# Patient Record
Sex: FEMALE | Race: WHITE | NOT HISPANIC OR LATINO | URBAN - METROPOLITAN AREA
[De-identification: names, ages, dates, MRNs, and addresses within clinical notes are randomized per-mention and may not be internally consistent; named-entity substitution may affect disease eponyms.]

---

## 2017-01-03 ENCOUNTER — THERAPY VISIT (OUTPATIENT)
Dept: CHIROPRACTIC MEDICINE | Facility: CLINIC | Age: 62
End: 2017-01-03
Payer: MEDICAID

## 2017-01-03 DIAGNOSIS — M54.50 LUMBAGO: ICD-10-CM

## 2017-01-03 DIAGNOSIS — M62.838 SPASM OF MUSCLE: ICD-10-CM

## 2017-01-03 DIAGNOSIS — M99.03 SEGMENTAL DYSFUNCTION OF LUMBAR REGION: ICD-10-CM

## 2017-01-03 DIAGNOSIS — M99.05 SEGMENTAL DYSFUNCTION OF PELVIC REGION: Primary | ICD-10-CM

## 2017-01-03 PROCEDURE — 98940 CHIROPRACT MANJ 1-2 REGIONS: CPT | Mod: AT | Performed by: CHIROPRACTOR

## 2017-01-03 PROCEDURE — 99203 OFFICE O/P NEW LOW 30 MIN: CPT | Mod: 25 | Performed by: CHIROPRACTOR

## 2017-01-03 NOTE — MR AVS SNAPSHOT
"              After Visit Summary   1/3/2017    Lucia Hector    MRN: [id number]           Patient Information     Date Of Birth          1955        Visit Information        Provider Department      1/3/2017 10:00 AM Joaquim Montes DC [facility]        Today's Diagnoses     Segmental dysfunction of pelvic region    -  1     Spasm of muscle         Segmental dysfunction of lumbar region         Lumbago            Follow-ups after your visit        Who to contact     If you have questions or need follow up information about today's clinic visit or your schedule please contact [facility] directly at [phone].  Normal or non-critical lab and imaging results will be communicated to you by Diagnosofthart, letter or phone within 4 business days after the clinic has received the results. If you do not hear from us within 7 days, please contact the clinic through Diagnosofthart or phone. If you have a critical or abnormal lab result, we will notify you by phone as soon as possible.  Submit refill requests through Casabu or call your pharmacy and they will forward the refill request to us. Please allow 3 business days for your refill to be completed.          Additional Information About Your Visit        MyChart Information     Casabu lets you send messages to your doctor, view your test results, renew your prescriptions, schedule appointments and more. To sign up, go to [email or web address] . Click on \"Log in\" on the left side of the screen, which will take you to the Welcome page. Then click on \"Sign up Now\" on the right side of the page.     You will be asked to enter the access code listed below, as well as some personal information. Please follow the directions to create your username and password.     Your access code is: [email or web address]  Expires: 3/13/2017  2:12 PM     Your access code will  in 90 days. If you need help or a new code, please call your [place] clinic or " [phone].        Care EveryWhere ID     This is your Care EveryWhere ID. This could be used by other organizations to access your [place] medical records  [id number]        Your Vitals Were     Last Period                   07/12/2008            Blood Pressure from Last 3 Encounters:   12/15/16 114/65   12/13/16 113/64   11/23/15 132/84    Weight from Last 3 Encounters:   12/15/16 64.411 kg (142 lb)   12/13/16 63.05 kg (139 lb)   11/23/15 68.266 kg (150 lb 8 oz)              We Performed the Following     CHIROPRAC MANIP,SPINAL,1-2 REGIONS     OFFICE/OUTPT VISIT,APRIL NIELSEN III        Primary Care Provider Office Phone # Fax #    Reggie Burnette [phone]       [address] [facility] [address]        Thank you!     Thank you for choosing [facility]  for your care. Our goal is always to provide you with excellent care. Hearing back from our patients is one way we can continue to improve our services. Please take a few minutes to complete the written survey that you may receive in the mail after your visit with us. Thank you!             Your Updated Medication List - Protect others around you: Learn how to safely use, store and throw away your medicines at www.disposemymeds.org.      Notice  As of 1/3/2017 11:30 AM    You have not been prescribed any medications.
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
## 2017-01-06 ENCOUNTER — TELEPHONE (OUTPATIENT)
Dept: INTERNAL MEDICINE | Facility: CLINIC | Age: 62
End: 2017-01-06

## 2017-01-06 NOTE — Clinical Note
January 6, 2017    Lucia Hector  [address]  [facility] [address]    Dear Lucia    We care about your health and have reviewed your health plan. We have reviewed your medical conditions, medication list, and lab results and are making recommendations based on this review, to better manage your health.    You are in particular need of attention regarding:  - Completing a Colon Cancer Screening (FIT) - Please complete FIT test a test to check for blood in your stool  and mail completed test to clinic.      Here is a list of Health Maintenance topics that are due now or due soon:  Health Maintenance Due   Topic Date Due     HEPATITIS C SCREENING  12/29/1975     FIT Q1 YR (NO INBASKET)  08/13/2016     INFLUENZA VACCINE (SYSTEM ASSIGNED)  09/01/2016     ADVANCE DIRECTIVE PLANNING Q5 YRS (NO INBASKET)  12/13/2016     We will be calling you in the next couple of weeks to help you schedule any appointments that are needed.  Please call us at [phone] (or use Sendori) to address the above recommendations.     Thank you for trusting [facility] and we appreciate the opportunity to serve you.  We look forward to supporting your healthcare needs in the future.    Healthy Regards,    Rachael Moralze/josué

## 2017-01-06 NOTE — TELEPHONE ENCOUNTER
Panel Management Review      Patient has the following on her problem list:       Composite cancer screening  Chart review shows that this patient is due/due soon for the following Fecal Colorectal (FIT)  Summary:    Patient is due/failing the following:   FIT    Action needed:   Needs to complete a FIT     Type of outreach:    Sent letter.    Questions for provider review:    None                                                                                                                                    Abida Fang ma       Chart routed to Care Team .

## 2017-01-18 ENCOUNTER — THERAPY VISIT (OUTPATIENT)
Dept: CHIROPRACTIC MEDICINE | Facility: CLINIC | Age: 62
End: 2017-01-18
Payer: MEDICAID

## 2017-01-18 DIAGNOSIS — M54.50 LUMBAGO: ICD-10-CM

## 2017-01-18 DIAGNOSIS — M99.03 SEGMENTAL DYSFUNCTION OF LUMBAR REGION: ICD-10-CM

## 2017-01-18 DIAGNOSIS — M99.05 SEGMENTAL DYSFUNCTION OF PELVIC REGION: Primary | ICD-10-CM

## 2017-01-18 DIAGNOSIS — M62.838 SPASM OF MUSCLE: ICD-10-CM

## 2017-01-18 PROCEDURE — 98940 CHIROPRACT MANJ 1-2 REGIONS: CPT | Mod: AT | Performed by: CHIROPRACTOR

## 2017-01-18 NOTE — MR AVS SNAPSHOT
"              After Visit Summary   2017    Lucia Hector    MRN: [id number]           Patient Information     Date Of Birth          1955        Visit Information        Provider Department      2017 11:00 AM Joaquim Montes DC [facility]        Today's Diagnoses     Segmental dysfunction of pelvic region    -  1     Lumbago         Segmental dysfunction of lumbar region         Spasm of muscle            Follow-ups after your visit        Who to contact     If you have questions or need follow up information about today's clinic visit or your schedule please contact [facility] directly at [phone].  Normal or non-critical lab and imaging results will be communicated to you by Varonis Systemshart, letter or phone within 4 business days after the clinic has received the results. If you do not hear from us within 7 days, please contact the clinic through Varonis Systemshart or phone. If you have a critical or abnormal lab result, we will notify you by phone as soon as possible.  Submit refill requests through [facility] [employer] or call your pharmacy and they will forward the refill request to us. Please allow 3 business days for your refill to be completed.          Additional Information About Your Visit        MyChart Information     [facility] lets you send messages to your doctor, view your test results, renew your prescriptions, schedule appointments and more. To sign up, go to [email or web address] . Click on \"Log in\" on the left side of the screen, which will take you to the Welcome page. Then click on \"Sign up Now\" on the right side of the page.     You will be asked to enter the access code listed below, as well as some personal information. Please follow the directions to create your username and password.     Your access code is: -[email or web address]  Expires: 3/13/2017  2:12 PM     Your access code will  in 90 days. If you need help or a new code, please call your [place] clinic or " [phone].        Care EveryWhere ID     This is your Care EveryWhere ID. This could be used by other organizations to access your [place] medical records  [id number]        Your Vitals Were     Last Period                   07/12/2008            Blood Pressure from Last 3 Encounters:   12/15/16 114/65   12/13/16 113/64   11/23/15 132/84    Weight from Last 3 Encounters:   12/15/16 64.411 kg (142 lb)   12/13/16 63.05 kg (139 lb)   11/23/15 68.266 kg (150 lb 8 oz)              We Performed the Following     CHIROPRAC MANIP,SPINAL,1-2 REGIONS        Primary Care Provider Office Phone # Fax #    DESTINEY Peña [phone]       [address] [address]        Thank you!     Thank you for choosing [facility]  for your care. Our goal is always to provide you with excellent care. Hearing back from our patients is one way we can continue to improve our services. Please take a few minutes to complete the written survey that you may receive in the mail after your visit with us. Thank you!             Your Updated Medication List - Protect others around you: Learn how to safely use, store and throw away your medicines at www.disposemymeds.org.      Notice  As of 1/18/2017 11:51 AM    You have not been prescribed any medications.

## 2017-01-18 NOTE — PROGRESS NOTES
Visit #:  2 of 6, based on treatment plan    Subjective:  Lucia Hector is a 61 year old female who is seen in f/u up for:        Segmental dysfunction of pelvic region  Lumbago  Segmental dysfunction of lumbar region  Spasm of muscle.     Since last visit on 1/3/2017,  Lucia Hector reports the following changes: Pain immediately after last treatment: 2/10 and their pain level today 4/10.  Lucia reports taht she is not feeling pain rather she is feeling stiffness and discomfort.  She would like to be able to work out again comfortably.  Despite still feeling a bit stiff she does feel that there was a slight improvement overall.    Area of chief complaint:  Lumbar :  Symptoms are graded at 4/10. The quality is described as stiff.  Motion has increased, but is still not normal, has decreased, no improvement. Patient feels that they are improved due to a reduction in symptoms.        Objective:  The following was observed:    P: palpatory tenderness, R pififormis    A: static palpation demonstrates intersegmental asymmetry , pelvis and lumbar spine    R: motion palpation notes restricted motion, :  L4 Right rotation restricted  L5 Right rotation restricted  PSIS Right Extension restriction    T: hypertonicity at: Piriformis R>>L      Assessment:    Segmental spinal dysfunction/restrictions found at:  L4  L5  PSIS Right    Diagnoses:      1. Segmental dysfunction of pelvic region    2. Lumbago    3. Segmental dysfunction of lumbar region    4. Spasm of muscle        Patient's condition:  Patient had restrictions pre-manipulation    Treatment effectiveness:  Post manipulation there is better intersegmental movement and Patient claims to feel looser post manipulation      Procedures:  CMT:  69007 Chiropractic manipulative treatment 1-2 regions performed   Lumbar: Activator, L4, L5, Prone  Pelvis: Drop Table, PSIS Right , Prone    Modalities:  93712: MSTM:  To Piriformis  for 5 min    Therapeutic  procedures:  None      Prognosis: Good    Progress towards Goals: Patient is making progress towards the goal     Response to Treatment:   Reduction in symptoms as reported by patient      Recommendations:    Instructions:stretch as instructed at visit    Follow-up:  Return to care in one week.    Increase water intake -    Consider follow up with physical therapy

## 2017-01-31 ENCOUNTER — THERAPY VISIT (OUTPATIENT)
Dept: CHIROPRACTIC MEDICINE | Facility: CLINIC | Age: 62
End: 2017-01-31
Payer: MEDICAID

## 2017-01-31 DIAGNOSIS — M54.50 LUMBAGO: ICD-10-CM

## 2017-01-31 DIAGNOSIS — M99.05 SEGMENTAL DYSFUNCTION OF PELVIC REGION: Primary | ICD-10-CM

## 2017-01-31 DIAGNOSIS — M99.03 SOMATIC DYSFUNCTION OF LUMBAR REGION: ICD-10-CM

## 2017-01-31 DIAGNOSIS — M62.838 SPASM OF MUSCLE: ICD-10-CM

## 2017-01-31 PROCEDURE — 98940 CHIROPRACT MANJ 1-2 REGIONS: CPT | Mod: AT | Performed by: CHIROPRACTOR

## 2017-01-31 NOTE — PROGRESS NOTES
Visit #:  3 of 6, based on treatment plan    Subjective:  Lucia Hector is a 61 year old female who is seen in f/u up for:        Segmental dysfunction of pelvic region  Lumbago  Segmental dysfunction of lumbar region  Spasm of muscle.     Since last visit on 1/18/2017,  Lucia Hector reports the following changes: Pain immediately after last treatment: 2/10 and their pain level today 3/10.  Lucia reports that she is still feeling some stiffness in her low back but overall is feeling better and is not having any pain.  Overall she is feeling better.    Area of chief complaint:  Lumbar :  Symptoms are graded at 3/10. The quality is described as stiff.  Motion has increased, but is still not normal, has decreased, no improvement. Patient feels that they are improved due to a reduction in symptoms.        Objective:  The following was observed:    P: palpatory tenderness, R pififormis    A: static palpation demonstrates intersegmental asymmetry , pelvis and lumbar spine    R: motion palpation notes restricted motion, :  L4 Right rotation restricted  L5 Right rotation restricted  PSIS Right Extension restriction    T: hypertonicity at: Piriformis R>>L      Assessment:    Segmental spinal dysfunction/restrictions found at:  L4  L5  PSIS Right    Diagnoses:      1. Segmental dysfunction of pelvic region    2. Lumbago    3. Segmental dysfunction of lumbar region    4. Spasm of muscle        Patient's condition:  Patient had restrictions pre-manipulation    Treatment effectiveness:  Post manipulation there is better intersegmental movement and Patient claims to feel looser post manipulation      Procedures:  CMT:  87281 Chiropractic manipulative treatment 1-2 regions performed   Lumbar: Activator, L4, L5, Prone  Pelvis: Drop Table, PSIS Right , Prone    Modalities:  66372: MSTM:  To Piriformis  for 5 min    Therapeutic procedures:  None      Prognosis: Good    Progress towards Goals: Patient is making progress towards the  goal     Response to Treatment:   Reduction in symptoms as reported by patient      Recommendations:    Instructions:stretch as instructed at visit    Follow-up:  Return to care in one week.

## 2017-01-31 NOTE — MR AVS SNAPSHOT
"              After Visit Summary   2017    Lucia Hector    MRN: [id number]           Patient Information     Date Of Birth          1955        Visit Information        Provider Department      2017 11:45 AM Joaquim Montes [facility]        Today's Diagnoses     Segmental dysfunction of pelvic region    -  1     Lumbago         Somatic dysfunction of lumbar region         Spasm of muscle            Follow-ups after your visit        Who to contact     If you have questions or need follow up information about today's clinic visit or your schedule please contact [facility] directly at [phone].  Normal or non-critical lab and imaging results will be communicated to you by Online Prasadhart, letter or phone within 4 business days after the clinic has received the results. If you do not hear from us within 7 days, please contact the clinic through Online Prasadhart or phone. If you have a critical or abnormal lab result, we will notify you by phone as soon as possible.  Submit refill requests through [employer] or call your pharmacy and they will forward the refill request to us. Please allow 3 business days for your refill to be completed.          Additional Information About Your Visit        MyChart Information     B[employer] lets you send messages to your doctor, view your test results, renew your prescriptions, schedule appointments and more. To sign up, go to [email or web address] . Click on \"Log in\" on the left side of the screen, which will take you to the Welcome page. Then click on \"Sign up Now\" on the right side of the page.     You will be asked to enter the access code listed below, as well as some personal information. Please follow the directions to create your username and password.     Your access code is: [email or web address]  Expires: 3/13/2017  2:12 PM     Your access code will  in 90 days. If you need help or a new code, please call your [place] clinic or " [phone].        Care EveryWhere ID     This is your Care EveryWhere ID. This could be used by other organizations to access your [place] medical records  [id number]        Your Vitals Were     Last Period                   07/12/2008            Blood Pressure from Last 3 Encounters:   12/15/16 114/65   12/13/16 113/64   11/23/15 132/84    Weight from Last 3 Encounters:   12/15/16 64.411 kg (142 lb)   12/13/16 63.05 kg (139 lb)   11/23/15 68.266 kg (150 lb 8 oz)              We Performed the Following     CHIROPRAC MANIP,SPINAL,1-2 REGIONS        Primary Care Provider Office Phone # Fax #    DESTINEY Peña [phone]       [address] [address]        Thank you!     Thank you for choosing [facility]  for your care. Our goal is always to provide you with excellent care. Hearing back from our patients is one way we can continue to improve our services. Please take a few minutes to complete the written survey that you may receive in the mail after your visit with us. Thank you!             Your Updated Medication List - Protect others around you: Learn how to safely use, store and throw away your medicines at www.disposemymeds.org.      Notice  As of 1/31/2017 12:03 PM    You have not been prescribed any medications.

## 2017-02-23 ENCOUNTER — THERAPY VISIT (OUTPATIENT)
Dept: CHIROPRACTIC MEDICINE | Facility: CLINIC | Age: 62
End: 2017-02-23
Payer: MEDICAID

## 2017-02-23 DIAGNOSIS — M54.50 LUMBAGO: ICD-10-CM

## 2017-02-23 DIAGNOSIS — M99.02 THORACIC SEGMENT DYSFUNCTION: ICD-10-CM

## 2017-02-23 DIAGNOSIS — M99.03 SEGMENTAL DYSFUNCTION OF LUMBAR REGION: ICD-10-CM

## 2017-02-23 DIAGNOSIS — M99.05 SEGMENTAL DYSFUNCTION OF PELVIC REGION: Primary | ICD-10-CM

## 2017-02-23 DIAGNOSIS — M62.838 SPASM OF MUSCLE: ICD-10-CM

## 2017-02-23 PROCEDURE — 98941 CHIROPRACT MANJ 3-4 REGIONS: CPT | Mod: AT | Performed by: CHIROPRACTOR

## 2017-02-23 NOTE — PROGRESS NOTES
Visit #:  3 of 6, based on treatment plan    Subjective:  Lucia Hector is a 61 year old female who is seen in f/u up for:        Segmental dysfunction of pelvic region  Lumbago  Segmental dysfunction of lumbar region  Spasm of muscle.     Since last visit on 1/31/2017,  Lucia Hector reports the following changes: Pain immediately after last treatment: 2/10 and their pain level today 3/10.  Lucia reports that she is still feeling some stiffness in her low back but overall is feeling better and is not having any pain. She specifically notes that she is not having any pain rather it is more of a discomfort.    Area of chief complaint:  Lumbar :  Symptoms are graded at 3/10. The quality is described as stiff.  Motion has increased, but is still not normal, has decreased, no improvement. Patient feels that they are improved due to a reduction in symptoms.        Objective:  The following was observed:    P: palpatory tenderness, R pififormis    A: static palpation demonstrates intersegmental asymmetry , pelvis and lumbar spine    R: motion palpation notes restricted motion, :  T10 right rotation restricted  T11 right rotation restricted  T12 right rotation restricted  L4 Right rotation restricted  L5 Right rotation restricted  PSIS Right Extension restriction    T: hypertonicity at: Piriformis R>>L      Assessment:    Segmental spinal dysfunction/restrictions found at:  T10  T11  T12  L4  L5  PSIS Right    Diagnoses:      1. Segmental dysfunction of pelvic region    2. Lumbago    3. Segmental dysfunction of lumbar region    4. Spasm of muscle        Patient's condition:  Patient had restrictions pre-manipulation    Treatment effectiveness:  Post manipulation there is better intersegmental movement and Patient claims to feel looser post manipulation      Procedures:  CMT:  75902 Chiropractic manipulative treatment 3-4 regions performed   Thoracic: Activator T10, T11, T12  Lumbar: Activator, L4, L5, Prone  Pelvis: Drop  Table, PSIS Right , Prone    Modalities:  23513: MSTM:  To Piriformis  for 5 min    Therapeutic procedures:  None      Prognosis: Good    Progress towards Goals: Patient is making progress towards the goal     Response to Treatment:   Reduction in symptoms as reported by patient      Recommendations:    Instructions:stretch as instructed at visit    Follow-up:  Return to care in one week.

## 2017-03-14 ENCOUNTER — THERAPY VISIT (OUTPATIENT)
Dept: CHIROPRACTIC MEDICINE | Facility: CLINIC | Age: 62
End: 2017-03-14
Payer: MEDICAID

## 2017-03-14 DIAGNOSIS — M62.838 SPASM OF MUSCLE: ICD-10-CM

## 2017-03-14 DIAGNOSIS — M99.02 THORACIC SEGMENT DYSFUNCTION: ICD-10-CM

## 2017-03-14 DIAGNOSIS — M99.03 SEGMENTAL DYSFUNCTION OF LUMBAR REGION: ICD-10-CM

## 2017-03-14 DIAGNOSIS — M99.05 SEGMENTAL DYSFUNCTION OF PELVIC REGION: Primary | ICD-10-CM

## 2017-03-14 DIAGNOSIS — M54.50 LUMBAGO: ICD-10-CM

## 2017-03-14 PROCEDURE — 98941 CHIROPRACT MANJ 3-4 REGIONS: CPT | Mod: AT | Performed by: CHIROPRACTOR

## 2017-03-14 NOTE — PROGRESS NOTES
Visit #:  4 of 6, based on treatment plan    Subjective:  Lucia Hector is a 61 year old female who is seen in f/u up for:        Segmental dysfunction of pelvic region  Lumbago  Segmental dysfunction of lumbar region  Spasm of muscle.     Since last visit on 2/23/2017,  Lucia Hcetor reports the following changes: Pain immediately after last treatment: 2/10 and their pain level today 3/10.  Lucia reports that she is still feeling some stiffness in her low back but overall is feeling better and is not having any pain. She specifically notes that she is not having any pain rather it is more of a discomfort.  She has decided that she want s to start a stretching program to help with her low back.    Area of chief complaint:  Lumbar :  Symptoms are graded at 3/10. The quality is described as stiff.  Motion has increased, but is still not normal, has decreased, no improvement. Patient feels that they are improved due to a reduction in symptoms.        Objective:  The following was observed:    P: palpatory tenderness, R pififormis    A: static palpation demonstrates intersegmental asymmetry , pelvis and lumbar spine    R: motion palpation notes restricted motion, :  T10 right rotation restricted  T11 right rotation restricted  T12 right rotation restricted  L4 Right rotation restricted  L5 Right rotation restricted  PSIS Right Extension restriction    T: hypertonicity at: Piriformis R>>L      Assessment:    Segmental spinal dysfunction/restrictions found at:  T10  T11  T12  L4  L5  PSIS Right    Diagnoses:      1. Segmental dysfunction of pelvic region    2. Lumbago    3. Segmental dysfunction of lumbar region    4. Spasm of muscle        Patient's condition:  Patient had restrictions pre-manipulation    Treatment effectiveness:  Post manipulation there is better intersegmental movement and Patient claims to feel looser post manipulation      Procedures:  CMT:  76680 Chiropractic manipulative treatment 3-4 regions  performed   Thoracic: Activator T10, T11, T12  Lumbar: Activator, L4, L5, Prone  Pelvis: Drop Table, PSIS Right , Prone    Modalities:  74652: MSTM:  To Piriformis  for 5 min    Therapeutic procedures:  None      Prognosis: Good    Progress towards Goals: Patient is making progress towards the goal     Response to Treatment:   Reduction in symptoms as reported by patient      Recommendations:    Instructions:stretch as instructed at visit    Follow-up:  Return to care in one week.

## 2017-05-26 ENCOUNTER — OFFICE VISIT (OUTPATIENT)
Dept: FAMILY MEDICINE | Facility: CLINIC | Age: 62
End: 2017-05-26
Payer: MEDICAID

## 2017-05-26 VITALS
HEIGHT: 64 IN | SYSTOLIC BLOOD PRESSURE: 138 MMHG | DIASTOLIC BLOOD PRESSURE: 90 MMHG | TEMPERATURE: 98.1 F | WEIGHT: 150 LBS | BODY MASS INDEX: 25.61 KG/M2 | HEART RATE: 67 BPM

## 2017-05-26 DIAGNOSIS — S50.862A TICK BITE OF FOREARM, LEFT, INITIAL ENCOUNTER: ICD-10-CM

## 2017-05-26 DIAGNOSIS — R03.0 ELEVATED BLOOD PRESSURE READING WITHOUT DIAGNOSIS OF HYPERTENSION: ICD-10-CM
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
## 2017-05-26 DIAGNOSIS — H10.33 ACUTE BACTERIAL CONJUNCTIVITIS OF BOTH EYES: Primary | ICD-10-CM

## 2017-05-26 PROCEDURE — 99214 OFFICE O/P EST MOD 30 MIN: CPT | Performed by: FAMILY MEDICINE

## 2017-05-26 RX ORDER — DOXYCYCLINE 100 MG/1
200 CAPSULE ORAL ONCE
Qty: 2 CAPSULE | Refills: 0 | Status: SHIPPED | OUTPATIENT
Start: 2017-05-26 | End: 2017-05-26

## 2017-05-26 RX ORDER — OFLOXACIN 3 MG/ML
1 SOLUTION/ DROPS OPHTHALMIC EVERY 4 HOURS
Qty: 1 BOTTLE | Refills: 0 | Status: SHIPPED | OUTPATIENT
Start: 2017-05-26 | End: 2018-01-11

## 2017-05-26 RX ORDER — POLYMYXIN B SULFATE AND TRIMETHOPRIM 1; 10000 MG/ML; [USP'U]/ML
1 SOLUTION OPHTHALMIC
COMMUNITY
Start: 2017-05-21 | End: 2017-05-26

## 2017-05-26 NOTE — MR AVS SNAPSHOT
"              After Visit Summary   5/26/2017    Lucia Hector    MRN: [id number]           Patient Information     Date Of Birth          1955        Visit Information        Provider Department      5/26/2017 1:00 PM Stefani Alvarez MD [facility]        Today's Diagnoses     Acute bacterial conjunctivitis of both eyes    -  1    Tick bite of forearm, left, initial encounter        Elevated blood pressure reading without diagnosis of hypertension           Follow-ups after your visit        Follow-up notes from your care team     Return if symptoms worsen or fail to improve, for Physical Exam at earliest convenience.      Who to contact     Normal or non-critical lab and imaging results will be communicated to you by Attolighthart, letter or phone within 4 business days after the clinic has received the results. If you do not hear from us within 7 days, please contact the clinic through Attolighthart or phone. If you have a critical or abnormal lab result, we will notify you by phone as soon as possible.  Submit refill requests through [email or web address] or call your pharmacy and they will forward the refill request to us. Please allow 3 business days for your refill to be completed.          If you need to speak with a  for additional information , please call: [phone]             Additional Information About Your Visit        AttolightharSkinkers Information     [email or web address] lets you send messages to your doctor, view your test results, renew your prescriptions, schedule appointments and more. To sign up, go to [email or web address] . Click on \"Log in\" on the left side of the screen, which will take you to the Welcome page. Then click on \"Sign up Now\" on the right side of the page.     You will be asked to enter the access code listed below, as well as some personal information. Please follow the directions to create your username and password.     Your access code is: [email or web address]  Expires: 8/24/2017  1:29 " "PM     Your access code will  in 90 days. If you need help or a new code, please call your [place] clinic or [phone].        Care EveryWhere ID     This is your Care EveryWhere ID. This could be used by other organizations to access your [place] medical records  [id number]        Your Vitals Were     Pulse Temperature Height Last Period Breastfeeding? BMI (Body Mass Index)    67 98.1  F (36.7  C) (Tympanic) 5' 3.5\" (1.613 m) 2008 No 26.15 kg/m2       Blood Pressure from Last 3 Encounters:   17 147/81   12/15/16 114/65   16 113/64    Weight from Last 3 Encounters:   17 150 lb (68 kg)   12/15/16 142 lb (64.4 kg)   16 139 lb (63 kg)              Today, you had the following     No orders found for display         Today's Medication Changes          These changes are accurate as of: 17  1:29 PM.  If you have any questions, ask your nurse or doctor.               Start taking these medicines.        Dose/Directions    doxycycline 100 MG capsule   Commonly known as:  VIBRAMYCIN   Used for:  Tick bite of forearm, left, initial encounter   Started by:  Stefani Alvarez MD        Dose:  200 mg   Take 2 capsules (200 mg) by mouth once for 1 dose   Quantity:  2 capsule   Refills:  0       ofloxacin 0.3 % ophthalmic solution   Commonly known as:  OCUFLOX   Used for:  Acute bacterial conjunctivitis of both eyes   Started by:  Stefani Alvarez MD        Dose:  1 drop   Place 1 drop into both eyes every 4 hours   Quantity:  1 Bottle   Refills:  0         Stop taking these medicines if you haven't already. Please contact your care team if you have questions.     trimethoprim-polymyxin b ophthalmic solution   Commonly known as:  POLYTRIM   Stopped by:  Stefani Alvarez MD                Where to get your medicines      These medications were sent to [facility] - [address]  [address] [facility] [place] [address]     Phone:  [phone] "     doxycycline 100 MG capsule    ofloxacin 0.3 % ophthalmic solution                Primary Care Provider Office Phone # Fax #    DESTINEY Dominguez [facility] [phone]       [place] [address] [facility] [address]        Thank you!     Thank you for choosing [facility]  for your care. Our goal is always to provide you with excellent care. Hearing back from our patients is one way we can continue to improve our services. Please take a few minutes to complete the written survey that you may receive in the mail after your visit with us. Thank you!             Your Updated Medication List - Protect others around you: Learn how to safely use, store and throw away your medicines at www.disposemymeds.org.          This list is accurate as of: 5/26/17  1:29 PM.  Always use your most recent med list.                   Brand Name Dispense Instructions for use    doxycycline 100 MG capsule    VIBRAMYCIN    2 capsule    Take 2 capsules (200 mg) by mouth once for 1 dose       ofloxacin 0.3 % ophthalmic solution    OCUFLOX    1 Bottle    Place 1 drop into both eyes every 4 hours

## 2017-05-26 NOTE — PROGRESS NOTES
"  SUBJECTIVE:                                                    Lucia Hector is a 61 year old female who presents to clinic today for the following health issues:      ED/UC Followup:    Facility:  [facility]   Date of visit: 5/21/17  Reason for visit: acute bacterial conjunctivitis of both eyes.  Was exposed to pink eye at work ().  Current Status: Today is day 5 of eyes drops (Polytrim)- feels symptoms have improved mildly.  Still having redness, itching, and mattering/ watering.      Possible Tick Bite  Located on left forearm, noticed x 2 days ago.    She did not remove anything from skin.    Redness and inflammation of skin has increased over the past day.       Denies any fever or chills.   Feels like the Polytrim eye drops have been ineffective. Denies wearing contact lenses.    Blood pressure is slightly elevated.     Problem list and histories reviewed & adjusted, as indicated.  Additional history: as documented    Current Outpatient Prescriptions   Medication Sig Dispense Refill     ofloxacin (OCUFLOX) 0.3 % ophthalmic solution Place 1 drop into both eyes every 4 hours 1 Bottle 0     doxycycline (VIBRAMYCIN) 100 MG capsule Take 2 capsules (200 mg) by mouth once for 1 dose 2 capsule 0     No Known Allergies    Reviewed and updated as needed this visit by clinical staff  Tobacco  Allergies  Meds       Reviewed and updated as needed this visit by Provider         ROS:  Constitutional, HEENT, cardiovascular, pulmonary, gi and gu systems are negative, except as otherwise noted.    OBJECTIVE:                                                    /90  Pulse 67  Temp 98.1  F (36.7  C) (Tympanic)  Ht 5' 3.5\" (1.613 m)  Wt 150 lb (68 kg)  LMP 07/12/2008  Breastfeeding? No  BMI 26.15 kg/m2  Body mass index is 26.15 kg/(m^2).  GENERAL: healthy, alert and no distress  EYES: bilateral conjunctivitis with yellowish discharge.  SKIN: Sub-centimeter area of erythema. No " bull's eye appearance of a rash.   Scab cleaned with alcohol swab, dark particle removed, no clear tick removal. Band aid applied    Diagnostic Test Results:  none      ASSESSMENT/PLAN:                                                    Lucia was seen today for urgent care, conjunctivitis and insect bites.    Diagnoses and all orders for this visit:    Acute bacterial conjunctivitis of both eyes failed Polytrim eye drops  -     ofloxacin (OCUFLOX) 0.3 % ophthalmic solution; Place 1 drop into both eyes every 4 hours    Tick bite of forearm, left, initial encounter  -     doxycycline (VIBRAMYCIN) 100 MG capsule; Take 2 capsules (200 mg) by mouth once for 1 dose    Elevated blood pressure reading without diagnosis of hypertension  Repeat BP at the end of the visit improved minimally. Continue to monitor.        Follow up if symptoms fail to improve or worsen.      The patient was in agreement with the plan today and had no questions or concerns prior to leaving the clinic.    Schedule a Physical Exam at earliest convenience.     Stefani Alvarez MD  [facility]

## 2017-05-26 NOTE — NURSING NOTE
"Chief Complaint   Patient presents with     Urgent Care     Follow up      Conjunctivitis     Insect Bites       Initial /81  Pulse 67  Temp 98.1  F (36.7  C) (Tympanic)  Ht 5' 3.5\" (1.613 m)  Wt 150 lb (68 kg)  LMP 07/12/2008  Breastfeeding? No  BMI 26.15 kg/m2 Estimated body mass index is 26.15 kg/(m^2) as calculated from the following:    Height as of this encounter: 5' 3.5\" (1.613 m).    Weight as of this encounter: 150 lb (68 kg).  Medication Reconciliation: complete       Ashley Kunz MA      "

## 2017-06-05 ENCOUNTER — TELEPHONE (OUTPATIENT)
Dept: FAMILY MEDICINE | Facility: CLINIC | Age: 62
End: 2017-06-05

## 2017-06-05 NOTE — LETTER
June 5, 2017    Lucia Hector  [address]  [place] [address]    Dear Lucia    We care about your health and have reviewed your health plan. We have reviewed your medical conditions, medication list, and lab results and are making recommendations based on this review, to better manage your health.    You are in particular need of attention regarding:  - Completing a Colon Cancer Screening (FIT) - Please complete FIT test sent to you in September and mail completed test to clinic.      Here is a list of Health Maintenance topics that are due now or due soon:  Health Maintenance Due   Topic Date Due     HEPATITIS C SCREENING  12/29/1975     FIT Q1 YR  08/13/2016     ADVANCE DIRECTIVE PLANNING Q5 YRS  12/13/2016     We will be calling you in the next couple of weeks to help you schedule any appointments that are needed.  Please call us at [phone] (or use [employer]Hub) to address the above recommendations.     Thank you for trusting [facility] and we appreciate the opportunity to serve you.  We look forward to supporting your healthcare needs in the future.    Healthy Regards,    Your [facility]

## 2017-06-05 NOTE — TELEPHONE ENCOUNTER
Panel Management Review      Patient has the following on her problem list: None      Composite cancer screening  Chart review shows that this patient is due/due soon for the following Fecal Colorectal (FIT)  Summary:    Patient is due/failing the following:   FIT    Action needed:   Patient needs to complete a FIT.    Type of outreach:    Sent letter.    Questions for provider review:    None                                                                                                                                    Yessenia See ABRAM Llanes     Chart routed to Care Team .

## 2017-06-09 ENCOUNTER — TELEPHONE (OUTPATIENT)
Dept: FAMILY MEDICINE | Facility: CLINIC | Age: 62
End: 2017-06-09

## 2017-06-09 NOTE — TELEPHONE ENCOUNTER
Spoke with patient , has been treated for pink eye x 2, finished last drops 7 days ago and symptoms returning the next day.  Mattering, blood shot eyes.  Denies pain, denies allergies, denies having an eye doctor  appt scheduled for [facility] to evaluate.  Patient asking about eye doctors, transferred patient back to scheduling to assist with eye doctors.  Gillian Hoang RN

## 2017-06-09 NOTE — TELEPHONE ENCOUNTER
Pt has been seen twice for pink eye and medication is not working after new medication given.  Would like to know what she should do.  Okay to leave message.

## 2017-09-28 ENCOUNTER — TELEPHONE (OUTPATIENT)
Dept: FAMILY MEDICINE | Facility: CLINIC | Age: 62
End: 2017-09-28

## 2017-09-28 NOTE — LETTER
September 28, 2017    Lucia Hector  [address]  [place] [address]    Dear Lucia    We care about your health and have reviewed your health plan. We have reviewed your medical conditions, medication list, and lab results and are making recommendations based on this review, to better manage your health.    You are in particular need of attention regarding:  - Completing a Colon Cancer Screening (FIT) - Please complete FIT test that can mailed to you and mail completed test to clinic.      Here is a list of Health Maintenance topics that are due now or due soon:  Health Maintenance Due   Topic Date Due     HEPATITIS C SCREENING  12/29/1975     FIT Q1 YR  08/13/2016     ADVANCE DIRECTIVE PLANNING Q5 YRS  12/13/2016     INFLUENZA VACCINE (SYSTEM ASSIGNED)  09/01/2017     We will be calling you in the next couple of weeks to help you schedule any appointments that are needed.  Please call us at [phone] (or use Apps[employer]ep) to address the above recommendations.     Thank you for trusting [facility] and we appreciate the opportunity to serve you.  We look forward to supporting your healthcare needs in the future.    Healthy Regards,    Your [facility]

## 2017-09-28 NOTE — TELEPHONE ENCOUNTER
Panel Management Review      Patient has the following on her problem list:       IVD   ASA: FAILED    Last LDL:    Lab Results   Component Value Date    CHOL 216 12/28/2016     Lab Results   Component Value Date    HDL 96 12/28/2016     Lab Results   Component Value Date     12/28/2016     Lab Results   Component Value Date    TRIG 36 12/28/2016        Lab Results   Component Value Date    CHOLHDLRATIO 2.4 11/11/2014        Is the patient on a Statin? NO   Is the patient on Aspirin? NO                    Last three blood pressure readings:  BP Readings from Last 3 Encounters:   05/26/17 138/90   12/15/16 114/65   12/13/16 113/64        Tobacco History:     History   Smoking Status     Never Smoker   Smokeless Tobacco     Never Used           Composite cancer screening  Chart review shows that this patient is due/due soon for the following Fecal Colorectal (FIT)  Summary:    Patient is due/failing the following:   FIT    Action needed:   Patient needs referral/order: FIT order pended    Type of outreach:    Sent letter.    Questions for provider review:    None                                                                                                                                    Yessenia Llanes MA     Chart routed to Care Team .

## 2017-09-28 NOTE — LETTER
October 12, 2017    Lucia Hector  [address]  [place] [facility] [place] [facility] [place] [address]      Dear Lucia Hector,     We have tried to contact you about your health, but have been unable to reach you.  Please call us as soon as possible so we can provide you with the best care possible.  We will continue to check in with you throughout the year to complete these items of care, if you are not able to complete these items at this time.  If you would like to complete the missing items for your care, please contact us at [phone].    We recommend the following:  -complete a FIT TEST a FIT test or Fecal Immunochemical Occult Blood Test is a take home stool sample kit.  It does not replace the colonoscopy for colorectal cancer screening, but it can detect hidden bleeding in the lower colon.  It does need to be repeated every year and if a positive result is obtained, you would be referred for a colonoscopy.  If you have completed either one of these tests at another facility, please have the records sent to our clinic so that we can best coordinate your care.    Sincerely,     Your Care Team at [facility]

## 2017-10-12 NOTE — TELEPHONE ENCOUNTER
Called pt and was unable to reach her. Left  for pt to return call. Final letter sent.  Yessenia See ABRAM Llanes

## 2018-01-05 ENCOUNTER — DOCUMENTATION ONLY (OUTPATIENT)
Dept: LAB | Facility: CLINIC | Age: 63
End: 2018-01-05

## 2018-01-05 DIAGNOSIS — Z12.11 SPECIAL SCREENING FOR MALIGNANT NEOPLASMS, COLON: ICD-10-CM

## 2018-01-05 DIAGNOSIS — E78.5 HYPERLIPIDEMIA LDL GOAL <160: ICD-10-CM

## 2018-01-05 DIAGNOSIS — E55.9 VITAMIN D DEFICIENCY DISEASE: Primary | ICD-10-CM

## 2018-01-08 ENCOUNTER — DOCUMENTATION ONLY (OUTPATIENT)
Dept: LAB | Facility: CLINIC | Age: 63
End: 2018-01-08

## 2018-01-08 DIAGNOSIS — E55.9 VITAMIN D DEFICIENCY DISEASE: ICD-10-CM

## 2018-01-08 DIAGNOSIS — Z11.59 ENCOUNTER FOR HEPATITIS C SCREENING TEST FOR LOW RISK PATIENT: ICD-10-CM

## 2018-01-08 DIAGNOSIS — F41.1 GENERALIZED ANXIETY DISORDER: ICD-10-CM

## 2018-01-08 DIAGNOSIS — E78.5 HYPERLIPIDEMIA LDL GOAL <160: ICD-10-CM

## 2018-01-08 DIAGNOSIS — E78.5 HYPERLIPIDEMIA LDL GOAL <160: Primary | ICD-10-CM

## 2018-01-08 LAB
ANION GAP SERPL CALCULATED.3IONS-SCNC: 11 MMOL/L (ref 3–14)
BUN SERPL-MCNC: 17 MG/DL (ref 7–30)
CALCIUM SERPL-MCNC: 8.9 MG/DL (ref 8.5–10.1)
CHLORIDE SERPL-SCNC: 104 MMOL/L (ref 94–109)
CHOLEST SERPL-MCNC: 254 MG/DL
CO2 SERPL-SCNC: 24 MMOL/L (ref 20–32)
CREAT SERPL-MCNC: 0.67 MG/DL (ref 0.52–1.04)
DEPRECATED CALCIDIOL+CALCIFEROL SERPL-MC: 17 UG/L (ref 20–75)
ERYTHROCYTE [DISTWIDTH] IN BLOOD BY AUTOMATED COUNT: 12.8 % (ref 10–15)
GFR SERPL CREATININE-BSD FRML MDRD: 90 ML/MIN/1.7M2
GLUCOSE SERPL-MCNC: 90 MG/DL (ref 70–99)
HCT VFR BLD AUTO: 39.7 % (ref 35–47)
HDLC SERPL-MCNC: 105 MG/DL
HGB BLD-MCNC: 13.1 G/DL (ref 11.7–15.7)
LDLC SERPL CALC-MCNC: 136 MG/DL
MCH RBC QN AUTO: 30.7 PG (ref 26.5–33)
MCHC RBC AUTO-ENTMCNC: 33 G/DL (ref 31.5–36.5)
MCV RBC AUTO: 93 FL (ref 78–100)
NONHDLC SERPL-MCNC: 149 MG/DL
PLATELET # BLD AUTO: 230 10E9/L (ref 150–450)
POTASSIUM SERPL-SCNC: 3.8 MMOL/L (ref 3.4–5.3)
RBC # BLD AUTO: 4.27 10E12/L (ref 3.8–5.2)
SODIUM SERPL-SCNC: 139 MMOL/L (ref 133–144)
TRIGL SERPL-MCNC: 65 MG/DL
TSH SERPL DL<=0.005 MIU/L-ACNC: 2.42 MU/L (ref 0.4–4)
WBC # BLD AUTO: 4 10E9/L (ref 4–11)

## 2018-01-08 PROCEDURE — 36415 COLL VENOUS BLD VENIPUNCTURE: CPT | Performed by: INTERNAL MEDICINE

## 2018-01-08 PROCEDURE — 85027 COMPLETE CBC AUTOMATED: CPT | Performed by: INTERNAL MEDICINE

## 2018-01-08 PROCEDURE — 84443 ASSAY THYROID STIM HORMONE: CPT | Performed by: INTERNAL MEDICINE

## 2018-01-08 PROCEDURE — 80048 BASIC METABOLIC PNL TOTAL CA: CPT | Performed by: INTERNAL MEDICINE

## 2018-01-08 PROCEDURE — 80061 LIPID PANEL: CPT | Performed by: INTERNAL MEDICINE

## 2018-01-08 PROCEDURE — 82306 VITAMIN D 25 HYDROXY: CPT | Performed by: INTERNAL MEDICINE

## 2018-01-08 PROCEDURE — G0472 HEP C SCREEN HIGH RISK/OTHER: HCPCS | Performed by: INTERNAL MEDICINE

## 2018-01-08 NOTE — LETTER
[facility]  [address] [place]  [address]  [phone]        January 9, 2018    Lucia Hector  [address]  [place] [facility] [address]        Dear Lucia,    Your labs are enclosed for review.  We can go over these at your upcoming appointment.  I look forward to seeing you then.     Results for orders placed or performed in visit on 01/08/18   **Vitamin D Deficiency FUTURE anytime   Result Value Ref Range    Vitamin D Deficiency screening 17 (L) 20 - 75 ug/L   Basic metabolic panel   Result Value Ref Range    Sodium 139 133 - 144 mmol/L    Potassium 3.8 3.4 - 5.3 mmol/L    Chloride 104 94 - 109 mmol/L    Carbon Dioxide 24 20 - 32 mmol/L    Anion Gap 11 3 - 14 mmol/L    Glucose 90 70 - 99 mg/dL    Urea Nitrogen 17 7 - 30 mg/dL    Creatinine 0.67 0.52 - 1.04 mg/dL    GFR Estimate 90 >60 mL/min/1.7m2    GFR Estimate If Black >90 >60 mL/min/1.7m2    Calcium 8.9 8.5 - 10.1 mg/dL   Lipid panel reflex to direct LDL Fasting   Result Value Ref Range    Cholesterol 254 (H) <200 mg/dL    Triglycerides 65 <150 mg/dL    HDL Cholesterol 105 >49 mg/dL    LDL Cholesterol Calculated 136 (H) <100 mg/dL    Non HDL Cholesterol 149 (H) <130 mg/dL   **Hepatitis C Screen Reflex to RNA FUTURE anytime   Result Value Ref Range    Hepatitis C Antibody Nonreactive NR^Nonreactive   TSH with free T4 reflex   Result Value Ref Range    TSH 2.42 0.40 - 4.00 mU/L   CBC with platelets   Result Value Ref Range    WBC 4.0 4.0 - 11.0 10e9/L    RBC Count 4.27 3.8 - 5.2 10e12/L    Hemoglobin 13.1 11.7 - 15.7 g/dL    Hematocrit 39.7 35.0 - 47.0 %    MCV 93 78 - 100 fl    MCH 30.7 26.5 - 33.0 pg    MCHC 33.0 31.5 - 36.5 g/dL    RDW 12.8 10.0 - 15.0 %    Platelet Count 230 150 - 450 10e9/L       If you have any questions please call the clinic at [phone].    Sincerely,    Lala ALMODOVAR

## 2018-01-09 LAB — HCV AB SERPL QL IA: NONREACTIVE

## 2018-01-09 NOTE — PROGRESS NOTES
Lucia Hector    Your labs are enclosed for review.  We can go over these at your upcoming appointment.  I look forward to seeing you then.     Sincerely,     DONELL ALBA M.D.

## 2018-01-11 ENCOUNTER — OFFICE VISIT (OUTPATIENT)
Dept: FAMILY MEDICINE | Facility: CLINIC | Age: 63
End: 2018-01-11
Payer: MEDICAID

## 2018-01-11 VITALS
DIASTOLIC BLOOD PRESSURE: 71 MMHG | HEART RATE: 61 BPM | BODY MASS INDEX: 26.58 KG/M2 | WEIGHT: 150 LBS | TEMPERATURE: 98 F | SYSTOLIC BLOOD PRESSURE: 132 MMHG | OXYGEN SATURATION: 96 % | HEIGHT: 63 IN

## 2018-01-11 DIAGNOSIS — Z12.31 ENCOUNTER FOR SCREENING MAMMOGRAM FOR BREAST CANCER: ICD-10-CM

## 2018-01-11 DIAGNOSIS — Z00.00 ROUTINE GENERAL MEDICAL EXAMINATION AT A HEALTH CARE FACILITY: Primary | ICD-10-CM

## 2018-01-11 DIAGNOSIS — E78.5 HYPERLIPIDEMIA LDL GOAL <160: ICD-10-CM

## 2018-01-11 PROCEDURE — 99396 PREV VISIT EST AGE 40-64: CPT | Performed by: INTERNAL MEDICINE

## 2018-01-11 NOTE — MR AVS SNAPSHOT
"              After Visit Summary   1/11/2018    Lucia Hector    MRN: [id number]           Patient Information     Date Of Birth          1955        Visit Information        Provider Department      1/11/2018 10:20 AM Lala Lamar MD [facility]        Today's Diagnoses     Routine general medical examination at a health care facility    -  1    Hyperlipidemia LDL goal <160        Encounter for screening mammogram for breast cancer          Care Instructions    Stool test (FIT).    COnsider looking into \"Cologard\" test:  Via [facility] (check via Internet)    Return to clinic one year or sooner if needed.     Check fasting labs Summer '18            Follow-ups after your visit        Future tests that were ordered for you today     Open Future Orders        Priority Expected Expires Ordered    Lipid panel reflex to direct LDL Fasting Routine  1/10/2019 1/11/2018    *MA Screening Digital Bilateral Routine  1/11/2019 1/11/2018            Who to contact     If you have questions or need follow up information about today's clinic visit or your schedule please contact [facility] directly at [phone].  Normal or non-critical lab and imaging results will be communicated to you by MyChart, letter or phone within 4 business days after the clinic has received the results. If you do not hear from us within 7 days, please contact the clinic through Lineahart or phone. If you have a critical or abnormal lab result, we will notify you by phone as soon as possible.  Submit refill requests through Memonic or call your pharmacy and they will forward the refill request to us. Please allow 3 business days for your refill to be completed.          Additional Information About Your Visit        MyChart Information     Memonic lets you send messages to your doctor, view your test results, renew your prescriptions, schedule appointments and more. To sign up, go to " "[email or web address] . Click on \"Log in\" on the left side of the screen, which will take you to the Welcome page. Then click on \"Sign up Now\" on the right side of the page.     You will be asked to enter the access code listed below, as well as some personal information. Please follow the directions to create your username and password.     Your access code is: [email or web address]  Expires: 2018 11:15 AM     Your access code will  in 90 days. If you need help or a new code, please call your [place] clinic or [phone].        Care EveryWhere ID     This is your Care EveryWhere ID. This could be used by other organizations to access your [place] medical records  [id number]        Your Vitals Were     Pulse Temperature Height Last Period Pulse Oximetry BMI (Body Mass Index)    61 98  F (36.7  C) (Oral) 5' 3.25\" (1.607 m) 2008 96% 26.36 kg/m2       Blood Pressure from Last 3 Encounters:   18 132/71   17 138/90   12/15/16 114/65    Weight from Last 3 Encounters:   18 150 lb (68 kg)   17 150 lb (68 kg)   12/15/16 142 lb (64.4 kg)               Primary Care Provider Office Phone # Fax #    Rachael Ricardo Kirt APRMAIKEL [facility] [phone]       [address] [facility] [address]        Equal Access to Services     AGUSTINA AGUILAR : Hadii nury ku hadasho Soomaali, waaxda luqadaha, qaybta kaalmada adeegyada, praneeth banks. So [facility] [phone].    ATENCIÓN: Si habla español, tiene a woody disposición servicios gratuitos de asistencia lingüística. Llame al [phone].    We comply with applicable federal civil rights laws and [place] laws. We do not discriminate on the basis of race, color, national origin, age, disability, sex, sexual orientation, or gender identity.            Thank you!     Thank you for choosing [facility]  for your care. Our goal is always to provide you with excellent care. Hearing back from " our patients is one way we can continue to improve our services. Please take a few minutes to complete the written survey that you may receive in the mail after your visit with us. Thank you!             Your Updated Medication List - Protect others around you: Learn how to safely use, store and throw away your medicines at www.disposemymeds.org.      Notice  As of 1/11/2018 11:15 AM    You have not been prescribed any medications.

## 2018-01-11 NOTE — NURSING NOTE
"Chief Complaint   Patient presents with     Physical     Health Maintenance     *_* Health Care Directive *_*       Initial /71 (BP Location: Right arm, Patient Position: Sitting, Cuff Size: Adult Regular)  Pulse 61  Temp 98  F (36.7  C) (Oral)  Ht 5' 3.25\" (1.607 m)  Wt 150 lb (68 kg)  LMP 07/12/2008  SpO2 96%  BMI 26.36 kg/m2 Estimated body mass index is 26.36 kg/(m^2) as calculated from the following:    Height as of this encounter: 5' 3.25\" (1.607 m).    Weight as of this encounter: 150 lb (68 kg).  Medication Reconciliation: complete   Abida Fang ma       "
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
## 2018-01-11 NOTE — PATIENT INSTRUCTIONS
"Stool test (FIT).    COnsider looking into \"Cologard\" test:  Via [facility] (check via Internet)    Return to clinic one year or sooner if needed.     Check fasting labs Summer '18    "

## 2018-01-12 NOTE — NURSING NOTE
Treatment Provider Report Form faxed to [facility] Professionals Services Program.

## 2018-02-14 ENCOUNTER — TELEPHONE (OUTPATIENT)
Dept: FAMILY MEDICINE | Facility: CLINIC | Age: 63
End: 2018-02-14

## 2018-02-14 NOTE — TELEPHONE ENCOUNTER
Panel Management Review      Patient has the following on her problem list:       Composite cancer screening  Chart review shows that this patient is due/due soon for the following Fecal Colorectal (FIT)  Summary:    Patient is due/failing the following:   FIT    Action needed:   Due for a FIT test    Type of outreach:None was given a FIT test in January None was given a FIT test in January     Questions for provider review:    None                                                                                                                                    Abida Fang ma       Chart routed to closing chart  .

## 2018-05-09 ENCOUNTER — OFFICE VISIT (OUTPATIENT)
Dept: FAMILY MEDICINE | Facility: CLINIC | Age: 63
End: 2018-05-09
Payer: COMMERCIAL

## 2018-05-09 VITALS — SYSTOLIC BLOOD PRESSURE: 123 MMHG | HEART RATE: 59 BPM | TEMPERATURE: 98.5 F | DIASTOLIC BLOOD PRESSURE: 72 MMHG
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
## 2018-05-09 PROCEDURE — 99213 OFFICE O/P EST LOW 20 MIN: CPT | Performed by: FAMILY MEDICINE

## 2018-05-09 RX ORDER — DOXYCYCLINE 100 MG/1
100 CAPSULE ORAL 2 TIMES DAILY
Qty: 20 CAPSULE | Refills: 0 | Status: SHIPPED | OUTPATIENT
Start: 2018-05-09 | End: 2018-05-19

## 2018-05-09 RX ORDER — CITALOPRAM HYDROBROMIDE 40 MG/1
40 TABLET ORAL DAILY
COMMUNITY
End: 2020-01-23

## 2018-05-09 NOTE — MR AVS SNAPSHOT
"              After Visit Summary   2018    Lucia Hector    MRN: [id number]           Patient Information     Date Of Birth          1955        Visit Information        Provider Department      2018 1:20 PM Safia Mendoza MD [facility]        Today's Diagnoses     Tick bite, initial encounter    -  1       Follow-ups after your visit        Who to contact     If you have questions or need follow up information about today's clinic visit or your schedule please contact [facility] directly at [phone].  Normal or non-critical lab and imaging results will be communicated to you by Section 101hart, letter or phone within 4 business days after the clinic has received the results. If you do not hear from us within 7 days, please contact the clinic through Section 101hart or phone. If you have a critical or abnormal lab result, we will notify you by phone as soon as possible.  Submit refill requests through HeyKiki or call your pharmacy and they will forward the refill request to us. Please allow 3 business days for your refill to be completed.          Additional Information About Your Visit        MyChart Information     HeyKiki lets you send messages to your doctor, view your test results, renew your prescriptions, schedule appointments and more. To sign up, go to [email or web address] . Click on \"Log in\" on the left side of the screen, which will take you to the Welcome page. Then click on \"Sign up Now\" on the right side of the page.     You will be asked to enter the access code listed below, as well as some personal information. Please follow the directions to create your username and password.     Your access code is: [email or web address]  Expires: 2018  3:43 PM     Your access code will  in 90 days. If you need help or a new code, please call your [facility] or [phone].        Care EveryWhere ID     This is your Care EveryWhere ID. This could " be used by other organizations to access your [place] medical records  [id number]        Your Vitals Were     Pulse Temperature Last Period             59 98.5  F (36.9  C) (Oral) 07/12/2008          Blood Pressure from Last 3 Encounters:   05/09/18 123/72   01/11/18 132/71   05/26/17 138/90    Weight from Last 3 Encounters:   01/11/18 150 lb (68 kg)   05/26/17 150 lb (68 kg)   12/15/16 142 lb (64.4 kg)              Today, you had the following     No orders found for display         Today's Medication Changes          These changes are accurate as of 5/9/18  3:43 PM.  If you have any questions, ask your nurse or doctor.               Start taking these medicines.        Dose/Directions    doxycycline monohydrate 100 MG capsule   Used for:  Tick bite, initial encounter   Started by:  Safia Mendoza MD        Dose:  100 mg   Take 1 capsule (100 mg) by mouth 2 times daily for 10 days   Quantity:  20 capsule   Refills:  0            Where to get your medicines      These medications were sent to [facility] [address] [place] [address]     Phone:  [phone]     doxycycline monohydrate 100 MG capsule                Primary Care Provider Office Phone # Fax #    Rachael DESTINEY Faust [facility] [phone]       [address] [facility] [address]        Equal Access to Services     JESUS AGUILAR AH: Hadii nury ku hadasho Soomaali, waaxda luqadaha, qaybta kaalmada adeegyada, praneeth zhong . So [facility] [place] [facility] [phone].    ATENCIÓN: Si habla español, tiene a woody disposición servicios gratuitos de asistencia lingüística. Llame al [phone].    We comply with applicable federal civil rights laws and [place] laws. We do not discriminate on the basis of race, color, national origin, age, disability, sex, sexual orientation, or gender identity.            Thank you!     Thank you for choosing [facility]  for your care. Our goal is always to provide you with excellent care. Hearing back from our patients is one way we can continue to improve our services. Please take a few minutes to complete the written survey that you may receive in the mail after your visit with us. Thank you!             Your Updated Medication List - Protect others around you: Learn how to safely use, store and throw away your medicines at www.disposemymeds.org.          This list is accurate as of 5/9/18  3:43 PM.  Always use your most recent med list.                   Brand Name Dispense Instructions for use Diagnosis    celeXA 40 MG tablet   Generic drug:  citalopram      Take 40 mg by mouth daily        doxycycline monohydrate 100 MG capsule     20 capsule    Take 1 capsule (100 mg) by mouth 2 times daily for 10 days    Tick bite, initial encounter

## 2018-05-09 NOTE — PROGRESS NOTES
SUBJECTIVE:   Lucia Hector is a 62 year old female who presents to clinic today for the following health issues:    Concern - Tick bite :  Onset: 3 days ago    Description:   Bitten on left had    Intensity: moderate    Progression of Symptoms:  improving    Accompanying Signs & Symptoms:  Site was red and had a ring around it, this has decreased    Previous history of similar problem:   no    Precipitating factors:   Worsened by: none    Alleviating factors:  Improved by: none  Therapies Tried and outcome: none    Was gardening on Sunday and noticed red spot on her left hand with surrounding redness the next day. She did not see tick attached to her skin.   Redness has been going down.   She had something similar happened last year or so ago. received doxycycline.   Pt lives in [place], she says tick bites are very common in area where she lives.     Pt felt tired yesterday. Denies fever, chills, joint pains, n/v, HA,  swollen glands etc.     Problem list and histories reviewed & adjusted, as indicated.  Additional history: as documented    Patient Active Problem List   Diagnosis     BASAL CELL CARCINOMA     Fibroadenosis of breast     Generalized anxiety disorder     Advanced directives, counseling/discussion     Hyperlipidemia LDL goal <160     Past Surgical History:   Procedure Laterality Date     FRACTURE TX, WRIST RT/LT  2010    ORIF   rt     HC REMOVAL OF OVARIAN CYST(S)  1983    laparotomy, 20 wks pregnant, ovarian cyst       Social History   Substance Use Topics     Smoking status: Never Smoker     Smokeless tobacco: Never Used     Alcohol use No     Family History   Problem Relation Age of Onset     DIABETES Father      HEART DISEASE Mother      Respiratory Sister          Current Outpatient Prescriptions   Medication Sig Dispense Refill     citalopram (CELEXA) 40 MG tablet Take 40 mg by mouth daily       No Known Allergies  Recent Labs   Lab Test  01/08/18   1003  12/28/16   0738  11/23/15   0954    02/26/13   1053   06/21/10   0806   A1C   --    --    --    --   5.5   --    --    LDL  136*  113*  119*   < >  136*   < >  128   HDL  105  96  89   < >  93   < >  82   TRIG  65  36  72   < >  66   < >  64   ALT   --   26   --    --   38   --   19   CR  0.67  0.77  0.74   --   0.75   < >   --    GFRESTIMATED  90  77  81   --   80   < >   --    GFRESTBLACK  >90  >90  African American GFR Calc    >90   GFR Calc     --   >90   < >   --    POTASSIUM  3.8  4.1  4.3   --   3.8   < >   --    TSH  2.42  2.48   --    --   1.41   < >  3.60    < > = values in this interval not displayed.      BP Readings from Last 3 Encounters:   05/09/18 123/72   01/11/18 132/71   05/26/17 138/90    Wt Readings from Last 3 Encounters:   01/11/18 150 lb (68 kg)   05/26/17 150 lb (68 kg)   12/15/16 142 lb (64.4 kg)                  Labs reviewed in EPIC    Reviewed and updated as needed this visit by clinical staff  Tobacco  Allergies  Meds  Med Hx  Surg Hx  Fam Hx  Soc Hx      Reviewed and updated as needed this visit by Provider         ROS:  Constitutional, HEENT, cardiovascular, pulmonary, gi and gu systems are negative, except as otherwise noted.    OBJECTIVE:     /72 (BP Location: Left arm, Patient Position: Sitting, Cuff Size: Adult Regular)  Pulse 59  Temp 98.5  F (36.9  C) (Oral)  LMP 07/12/2008  There is no height or weight on file to calculate BMI.  GENERAL: healthy, alert and no distress  NECK: no adenopathy  RESP: lungs clear to auscultation - no rales, rhonchi or wheezes  CV: regular rate and rhythm, normal S1 S2, no S3 or S4  Left hand: small area of redness, possible bite donna, hard to say. No surrounding erythema.   MS: no gross musculoskeletal defects noted, no edema    ASSESSMENT/PLAN:       ICD-10-CM    1. Tick bite, initial encounter W57.XXXA doxycycline monohydrate 100 MG capsule     Possible tick bite. Doxycycline as above. F/u PRN.     Safia Mendoza MD  [facility]  [place]

## 2018-06-14 ENCOUNTER — TELEPHONE (OUTPATIENT)
Dept: FAMILY MEDICINE | Facility: CLINIC | Age: 63
End: 2018-06-14

## 2018-06-14 NOTE — TELEPHONE ENCOUNTER
Panel Management Review      Patient has the following on her problem list:       Composite cancer screening  Chart review shows that this patient is due/due soon for the following Mammogram and Fecal Colorectal (FIT)  Summary:    Patient is due/failing the following:   FIT and MAMMOGRAM    Action needed:   Due for a mammo and Fit     Type of outreach:    Sent letter.    Questions for provider review:    None                                                                                                                                    Abida Fang ma       Chart routed to Care Team .

## 2018-06-14 NOTE — LETTER
June 21, 2018    Lucia Hector  [address]  [facility] [address]      Dear Lucia Hector,     We have tried to contact you about your health, but have been unable to reach you.  Please call us as soon as possible so we can provide you with the best care possible.  We will continue to check in with you throughout the year to complete these items of care, if you are not able to complete these items at this time.  If you would like to complete the missing items for your care, please contact us at [phone].    We recommend the following:  -schedule a MAMMOGRAM 1 in 8 women will develop invasive breast cancer during her lifetime and it is the most common non-skin cancer in American women.  EARLY detection, new treatments, and a better understanding of the disease have increased survival rates - the 5 year survival rate in the 1960s was 63% and today it is close to 90% .  Please disregard this reminder if you have had this exam elsewhere within the last year.  It would be helpful for us to have a copy of your mammogram report in our file so that we can best coordinate your care - please contact us with when your test was done so we can update your record. Please call [phone] to schedule your mammogram today.   -complete a FIT TEST a FIT test or Fecal Immunochemical Occult Blood Test is a take home stool sample kit.  It does not replace the colonoscopy for colorectal cancer screening, but it can detect hidden bleeding in the lower colon.  It does need to be repeated every year and if a positive result is obtained, you would be referred for a colonoscopy.  If you have completed either one of these tests at another facility, please have the records sent to our clinic so that we can best coordinate your care.        Sincerely,     Your Care Team at [facility]

## 2018-06-14 NOTE — LETTER
June 14, 2018    Lucia Hector  [address]  [facility] [address]    Dear Lucia    We care about your health and have reviewed your health plan. We have reviewed your medical conditions, medication list, and lab results and are making recommendations based on this review, to better manage your health.    You are in particular need of attention regarding:  - Scheduling a Breast Cancer Screening (Mammography) [phone]  - Completing a Colon Cancer Screening (FIT) - Please complete FIT test a screening test to check for colon cancer  and mail completed test to clinic.      Here is a list of Health Maintenance topics that are due now or due soon:  Health Maintenance Due   Topic Date Due     HIV SCREEN (SYSTEM ASSIGNED)  12/29/1975     FIT Q1 YR  08/13/2016     ADVANCE DIRECTIVE PLANNING Q5 YRS  12/13/2016     MAMMO SCREEN Q2 YR (SYSTEM ASSIGNED)  04/26/2018     We will be calling you in the next couple of weeks to help you schedule any appointments that are needed.  Please call us at [phone] (or use [employer]) to address the above recommendations.     Thank you for trusting [facility] and we appreciate the opportunity to serve you.  We look forward to supporting your healthcare needs in the future.    Healthy Regards,    Dr. Lamar/josué

## 2018-10-16 ENCOUNTER — TELEPHONE (OUTPATIENT)
Dept: FAMILY MEDICINE | Facility: CLINIC | Age: 63
End: 2018-10-16

## 2018-10-16 NOTE — TELEPHONE ENCOUNTER
Panel Management Review      Patient has the following on her problem list:       Composite cancer screening  Chart review shows that this patient is due/due soon for the following Mammogram and Fecal Colorectal (FIT)  Summary:    Patient is due/failing the following:   FIT and MAMMOGRAM    Action needed:   Due for a mammogram and FIT test     Type of outreach:    Sent letter.    Questions for provider review:    None                                                                                                                                    Abida Fang ma       Chart routed to Care Team .

## 2018-10-16 NOTE — LETTER
October 16, 2018    Lucia Hector  [address]  [facility] [address]    Dear Lucia    We care about your health and have reviewed your health plan. We have reviewed your medical conditions, medication list, and lab results and are making recommendations based on this review, to better manage your health.    You are in particular need of attention regarding:  - Scheduling a Breast Cancer Screening (Mammography) [phone]  - Completing a Colon Cancer Screening (FIT) - Please complete FIT test a screening test for colon cancer and mail completed test to clinic.      Here is a list of Health Maintenance topics that are due now or due soon:  Health Maintenance Due   Topic Date Due     HIV SCREEN (SYSTEM ASSIGNED)  12/29/1975     FIT Q1 YR  08/13/2016     ADVANCE DIRECTIVE PLANNING Q5 YRS  12/13/2016     MAMMO SCREEN Q2 YR (SYSTEM ASSIGNED)  04/26/2018     INFLUENZA VACCINE (1) 09/01/2018     We will be calling you in the next couple of weeks to help you schedule any appointments that are needed.  Please call us at [phone] (or use [employer]) to address the above recommendations.     Thank you for trusting [facility] and we appreciate the opportunity to serve you.  We look forward to supporting your healthcare needs in the future.    Healthy Regards,    Dr. Lamar/josué

## 2018-10-23 NOTE — TELEPHONE ENCOUNTER
Spoke to patient and she is going to check with her insurance if they will cover cologuard and she will schedule her own mammogram.

## 2018-11-13 ENCOUNTER — TELEPHONE (OUTPATIENT)
Dept: FAMILY MEDICINE | Facility: CLINIC | Age: 63
End: 2018-11-13

## 2018-11-13 DIAGNOSIS — Z12.11 SPECIAL SCREENING FOR MALIGNANT NEOPLASMS, COLON: Primary | ICD-10-CM

## 2018-11-13 NOTE — TELEPHONE ENCOUNTER
.Reason for Call:  Medication or medication refill:    Do you use a [facility]?  Name of the pharmacy and phone number for the current request:  Please mail script to patients home address.    Name of the medication requested: patient needs an Rx for the Cologuard.    Other request:     Can we leave a detailed message on this number? YES    Phone number patient can be reached at: Home number on file [phone] (home)    Best Time: any    Call taken on 11/13/2018 at 12:00 PM by Francisca Vu

## 2018-12-03 ENCOUNTER — RADIANT APPOINTMENT (OUTPATIENT)
Dept: MAMMOGRAPHY | Facility: CLINIC | Age: 63
End: 2018-12-03
Attending: INTERNAL MEDICINE
Payer: COMMERCIAL

## 2018-12-03 DIAGNOSIS — Z12.31 ENCOUNTER FOR SCREENING MAMMOGRAM FOR BREAST CANCER: ICD-10-CM

## 2018-12-03 PROCEDURE — 77067 SCR MAMMO BI INCL CAD: CPT | Mod: TC

## 2019-01-17 ENCOUNTER — TELEPHONE (OUTPATIENT)
Dept: FAMILY MEDICINE | Facility: CLINIC | Age: 64
End: 2019-01-17

## 2019-01-17 DIAGNOSIS — Z13.1 SCREENING FOR DIABETES MELLITUS: ICD-10-CM

## 2019-01-17 DIAGNOSIS — E78.5 HYPERLIPIDEMIA LDL GOAL <160: Primary | ICD-10-CM

## 2019-01-17 DIAGNOSIS — E55.9 HYPOVITAMINOSIS D: ICD-10-CM

## 2019-01-17 NOTE — TELEPHONE ENCOUNTER
Reason for Call:  Other / Labs orders request    Detailed comments: Patient called and scheduled a physical and labs. Patient also stated that she would like to have her labs done before coming to the physical, and therefore is requesting fasting labs orders to have the labs done on Monday 1/21/19.    Phone Number Patient can be reached at: Cell number on file:    Telephone Information:   Mobile [phone]       Best Time: Anytime    Can we leave a detailed message on this number? YES    Call taken on 1/17/2019 at 11:43 AM by Beulah Son

## 2019-01-17 NOTE — TELEPHONE ENCOUNTER
The existing future order for FLP is now .  Patient is going to see Dr. Engelmann for physical.  Routed to Dr. Engelmann to place needed pre-visit labs.    Susu Magallanes RN  [facility]

## 2019-01-17 NOTE — TELEPHONE ENCOUNTER
Attempted to call patient at home/mobile number, left brief message on voicemail; patient was advised labs are ordered as requested.   Patient was instructed to return call to [facility] RN directly on the RN call back line at [phone] if she has any questions.      Susu Magallanes, JESUS  [facility]

## 2019-01-21 DIAGNOSIS — Z13.1 SCREENING FOR DIABETES MELLITUS: ICD-10-CM

## 2019-01-21 DIAGNOSIS — E55.9 HYPOVITAMINOSIS D: ICD-10-CM

## 2019-01-21 DIAGNOSIS — E78.5 HYPERLIPIDEMIA LDL GOAL <160: ICD-10-CM

## 2019-01-21 LAB
ANION GAP SERPL CALCULATED.3IONS-SCNC: 4 MMOL/L (ref 3–14)
BUN SERPL-MCNC: 16 MG/DL (ref 7–30)
CALCIUM SERPL-MCNC: 8.5 MG/DL (ref 8.5–10.1)
CHLORIDE SERPL-SCNC: 106 MMOL/L (ref 94–109)
CHOLEST SERPL-MCNC: 232 MG/DL
CO2 SERPL-SCNC: 30 MMOL/L (ref 20–32)
CREAT SERPL-MCNC: 0.72 MG/DL (ref 0.52–1.04)
GFR SERPL CREATININE-BSD FRML MDRD: 90 ML/MIN/{1.73_M2}
GLUCOSE SERPL-MCNC: 87 MG/DL (ref 70–99)
HDLC SERPL-MCNC: 100 MG/DL
LDLC SERPL CALC-MCNC: 121 MG/DL
NONHDLC SERPL-MCNC: 132 MG/DL
POTASSIUM SERPL-SCNC: 4 MMOL/L (ref 3.4–5.3)
SODIUM SERPL-SCNC: 140 MMOL/L (ref 133–144)
TRIGL SERPL-MCNC: 54 MG/DL

## 2019-01-21 PROCEDURE — 82306 VITAMIN D 25 HYDROXY: CPT | Performed by: FAMILY MEDICINE

## 2019-01-21 PROCEDURE — 80048 BASIC METABOLIC PNL TOTAL CA: CPT | Performed by: FAMILY MEDICINE

## 2019-01-21 PROCEDURE — 36415 COLL VENOUS BLD VENIPUNCTURE: CPT | Performed by: FAMILY MEDICINE

## 2019-01-21 PROCEDURE — 80061 LIPID PANEL: CPT | Performed by: FAMILY MEDICINE

## 2019-01-21 NOTE — LETTER
[facility]   [address] [place]  [address]  [phone]                                   January 23, 2019    Lucia Hector  [address]  [facility] [address]        Dear Lucia,    Your vitamin d level is almost normal   Continue over the counter supplementation 2000 international unit(s) once a day should be sufficient    Results for orders placed or performed in visit on 01/21/19   **Vitamin D Deficiency FUTURE anytime   Result Value Ref Range    Vitamin D Deficiency screening 19 (L) 20 - 75 ug/L   Lipid panel reflex to direct LDL Fasting   Result Value Ref Range    Cholesterol 232 (H) <200 mg/dL    Triglycerides 54 <150 mg/dL    HDL Cholesterol 100 >49 mg/dL    LDL Cholesterol Calculated 121 (H) <100 mg/dL    Non HDL Cholesterol 132 (H) <130 mg/dL   **Basic metabolic panel FUTURE anytime   Result Value Ref Range    Sodium 140 133 - 144 mmol/L    Potassium 4.0 3.4 - 5.3 mmol/L    Chloride 106 94 - 109 mmol/L    Carbon Dioxide 30 20 - 32 mmol/L    Anion Gap 4 3 - 14 mmol/L    Glucose 87 70 - 99 mg/dL    Urea Nitrogen 16 7 - 30 mg/dL    Creatinine 0.72 0.52 - 1.04 mg/dL    GFR Estimate 90 >60 mL/min/[1.73_m2]    GFR Estimate If Black >90 >60 mL/min/[1.73_m2]    Calcium 8.5 8.5 - 10.1 mg/dL   If you have any questions please call the clinic at [phone]    Sincerely,    Alberto Todd MD   bmd  
Never smoker

## 2019-01-22 LAB — DEPRECATED CALCIDIOL+CALCIFEROL SERPL-MC: 19 UG/L (ref 20–75)

## 2019-01-22 NOTE — RESULT ENCOUNTER NOTE
Your vitamin d level is almost normal   Continue over the counter supplementation 2000 international unit(s) once a day should be sufficient

## 2019-01-25 ENCOUNTER — OFFICE VISIT (OUTPATIENT)
Dept: FAMILY MEDICINE | Facility: CLINIC | Age: 64
End: 2019-01-25
Payer: COMMERCIAL

## 2019-01-25 VITALS
HEART RATE: 64 BPM | SYSTOLIC BLOOD PRESSURE: 122 MMHG | DIASTOLIC BLOOD PRESSURE: 76 MMHG | WEIGHT: 151.5 LBS | HEIGHT: 64 IN | BODY MASS INDEX: 25.86 KG/M2 | TEMPERATURE: 98.1 F

## 2019-01-25 DIAGNOSIS — Z00.00 ROUTINE HISTORY AND PHYSICAL EXAMINATION OF ADULT: Primary | ICD-10-CM

## 2019-01-25 DIAGNOSIS — Z12.11 SCREEN FOR COLON CANCER: ICD-10-CM

## 2019-01-25 PROCEDURE — 99396 PREV VISIT EST AGE 40-64: CPT | Performed by: FAMILY MEDICINE

## 2019-01-25 ASSESSMENT — ENCOUNTER SYMPTOMS
ABDOMINAL PAIN: 0
FEVER: 0
DIARRHEA: 0
DIZZINESS: 0
FREQUENCY: 0
HEMATOCHEZIA: 0
COUGH: 0
NERVOUS/ANXIOUS: 0
CONSTIPATION: 0
HEMATURIA: 0
CHILLS: 0
EYE PAIN: 0

## 2019-01-25 ASSESSMENT — MIFFLIN-ST. JEOR: SCORE: 1229.26
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
## 2019-07-25 ENCOUNTER — OFFICE VISIT (OUTPATIENT)
Dept: FAMILY MEDICINE | Facility: CLINIC | Age: 64
End: 2019-07-25
Payer: COMMERCIAL

## 2019-07-25 ENCOUNTER — ANCILLARY PROCEDURE (OUTPATIENT)
Dept: GENERAL RADIOLOGY | Facility: CLINIC | Age: 64
End: 2019-07-25
Attending: INTERNAL MEDICINE
Payer: COMMERCIAL

## 2019-07-25 VITALS
HEART RATE: 63 BPM | TEMPERATURE: 98.4 F | BODY MASS INDEX: 27.2 KG/M2 | SYSTOLIC BLOOD PRESSURE: 116 MMHG | DIASTOLIC BLOOD PRESSURE: 72 MMHG | WEIGHT: 156 LBS | OXYGEN SATURATION: 95 %

## 2019-07-25 DIAGNOSIS — M25.532 LEFT WRIST PAIN: Primary | ICD-10-CM

## 2019-07-25 DIAGNOSIS — M25.532 LEFT WRIST PAIN: ICD-10-CM

## 2019-07-25 DIAGNOSIS — N95.2 ATROPHIC VAGINITIS: ICD-10-CM

## 2019-07-25 DIAGNOSIS — N94.10 DYSPAREUNIA IN FEMALE: ICD-10-CM

## 2019-07-25 DIAGNOSIS — S62.102A WRIST FRACTURE, LEFT, CLOSED, INITIAL ENCOUNTER: ICD-10-CM

## 2019-07-25 PROCEDURE — 73110 X-RAY EXAM OF WRIST: CPT | Mod: LT

## 2019-07-25 PROCEDURE — 99214 OFFICE O/P EST MOD 30 MIN: CPT | Performed by: INTERNAL MEDICINE

## 2019-07-25 RX ORDER — FAMOTIDINE 20 MG
TABLET ORAL
COMMUNITY
End: 2024-07-18

## 2019-07-25 RX ORDER — ESTRADIOL 0.1 MG/G
2 CREAM VAGINAL
Qty: 42.5 G | Refills: 3 | Status: SHIPPED | OUTPATIENT
Start: 2019-07-25 | End: 2021-01-25

## 2019-07-25 NOTE — PROGRESS NOTES
Subjective     Lucia Hector is a 63 year old female who presents to clinic today for the following health issues:    64 y/o F here for routine f/u     H/o VELASQUEZ (citalopram) and hyperlipidemia.      HPI   Discuss painful intercourse  Left wrist pain, and swollen x 4-6 weeks ... Fell at end of June while climbing off of something.  If not supported, has pain entire distal wrist.      In a new relationship, having pain with intercourse, had not had intercourse in 28 years.  Unable to penetrate due to pain.     Patient Active Problem List   Diagnosis     BASAL CELL CARCINOMA     Fibroadenosis of breast     Generalized anxiety disorder     Advanced directives, counseling/discussion     Hyperlipidemia LDL goal <160     QT prolongation     Impaired cognition     Traumatic brain injury (H)     Past Surgical History:   Procedure Laterality Date     FRACTURE TX, WRIST RT/LT  2010    ORIF   rt     HC REMOVAL OF OVARIAN CYST(S)  1983    laparotomy, 20 wks pregnant, ovarian cyst       Social History     Tobacco Use     Smoking status: Never Smoker     Smokeless tobacco: Never Used   Substance Use Topics     Alcohol use: No     Family History   Problem Relation Age of Onset     Diabetes Father      Heart Disease Mother      Respiratory Sister          Current Outpatient Medications   Medication Sig Dispense Refill     citalopram (CELEXA) 40 MG tablet Take 40 mg by mouth daily       Magnesium Oxide 250 MG TABS        vitamin B complex with vitamin C (VITAMIN  B COMPLEX) tablet Take 1 tablet by mouth daily       Vitamin D, Cholecalciferol, 1000 units CAPS        No Known Allergies  Recent Labs   Lab Test 01/21/19  0841 01/08/18  1003 12/28/16  0738  02/26/13  1053   A1C  --   --   --   --  5.5   * 136* 113*   < > 136*    105 96   < > 93   TRIG 54 65 36   < > 66   ALT  --   --  26  --  38   CR 0.72 0.67 0.77   < > 0.75   GFRESTIMATED 90 90 77   < > 80   GFRESTBLACK >90 >90 >90  African American GFR Calc     < > >90    POTASSIUM 4.0 3.8 4.1   < > 3.8   TSH  --  2.42 2.48  --  1.41    < > = values in this interval not displayed.      BP Readings from Last 3 Encounters:   07/25/19 116/72   01/25/19 122/76   05/09/18 123/72    Wt Readings from Last 3 Encounters:   07/25/19 70.8 kg (156 lb)   01/25/19 68.7 kg (151 lb 8 oz)   01/11/18 68 kg (150 lb)                       Reviewed and updated as needed this visit by Provider         Review of Systems   ROS COMP: Constitutional, HEENT, cardiovascular, pulmonary, gi and gu systems are negative, except as otherwise noted.      Objective    /72 (BP Location: Right arm, Patient Position: Sitting, Cuff Size: Adult Regular)   Pulse 63   Temp 98.4  F (36.9  C) (Oral)   Wt 70.8 kg (156 lb)   LMP 07/12/2008   SpO2 95%   BMI 27.20 kg/m    Body mass index is 27.2 kg/m .  Physical Exam   GENERAL: healthy, alert and no distress  EYES: Eyes grossly normal to inspection, PERRL and conjunctivae and sclerae normal   (female): normal female external genitalia, normal urethral meatus , vaginal mucosal atrophy, normal cervix, adnexae, and uterus without masses. and small laceration at inferior vaginal opening.  Some vaginal stenosis and pain with bimanual exam.  Due to pain, PAP deferred, will try again    MS: no gross musculoskeletal defects noted, no edema  MS:  Left wrist seems to have good range of motion but pain to deep touch, mild edema  SKIN: no suspicious lesions or rashes  NEURO: Normal strength and tone, mentation intact and speech normal  PSYCH: mentation appears normal, affect normal/bright    Diagnostic Test Results:  Xray = left wrist fracture.         Assessment & Plan       ICD-10-CM    1. Left wrist pain M25.532 XR Wrist Left G/E 3 Views   2. Dyspareunia in female N94.10 estradiol (ESTRACE) 0.1 MG/GM vaginal cream   3. Wrist fracture, left, closed, initial encounter S62.102A order for DME   4. Atrophic vaginitis N95.2         BMI:   Estimated body mass index is 27.2 kg/m   "as calculated from the following:    Height as of 1/25/19: 1.613 m (5' 3.5\").    Weight as of this encounter: 70.8 kg (156 lb).           Patient Instructions   Complete the stool test  Declines the colonoscopy      Estrogen cream via applicator twice weekly  Hopefully will improve the atrophic vaginitis and improve dysparunia.   If tissue better at next visit, can attempt PAP then...     Wrist brace  She was wearing a soft brace.  Needs to be immobilized.      Orthopedics referral.  In 4 days.        Declines HIV screen, colonoscopy    Will complete FIT (Has at home)      No follow-ups on file.    Lala Lamar MD  [facility]    "

## 2019-07-25 NOTE — PATIENT INSTRUCTIONS
Complete the stool test      Estrogen cream via applicator twice weekly    Wrist brace    Orthopedics referral.

## 2019-07-29 ENCOUNTER — OFFICE VISIT (OUTPATIENT)
Dept: ORTHOPEDICS | Facility: CLINIC | Age: 64
End: 2019-07-29
Payer: COMMERCIAL

## 2019-07-29 VITALS
DIASTOLIC BLOOD PRESSURE: 81 MMHG | SYSTOLIC BLOOD PRESSURE: 127 MMHG | OXYGEN SATURATION: 95 % | HEIGHT: 64 IN | HEART RATE: 60 BPM | WEIGHT: 156 LBS | BODY MASS INDEX: 26.63 KG/M2

## 2019-07-29 DIAGNOSIS — S52.602A CLOSED FRACTURE OF DISTAL ENDS OF LEFT RADIUS AND ULNA, INITIAL ENCOUNTER: ICD-10-CM

## 2019-07-29 DIAGNOSIS — S52.502A CLOSED FRACTURE OF DISTAL ENDS OF LEFT RADIUS AND ULNA, INITIAL ENCOUNTER: ICD-10-CM

## 2019-07-29 PROCEDURE — 25600 CLTX DST RDL FX/EPHYS SEP WO: CPT | Performed by: PHYSICIAN ASSISTANT

## 2019-07-29 ASSESSMENT — MIFFLIN-ST. JEOR: SCORE: 1249.67

## 2019-07-29 NOTE — LETTER
[address] [address]  [phone]          July 29, 2019    RE:  Lucia Hector                                                                                                                                                       [address]  [facility] [address]        To whom it may concern:    Lucia Hector is under my professional care for left wrist fracture.  She may continue to work in the cast, performing her current duties as a .    Sincerely,        Gus Redd MD

## 2019-07-29 NOTE — LETTER
[facility]  [address] [place] [id number]  [phone]          July 29, 2019    RE:  Lucia Hector                                                                                                                                                       [address]  [facility] [address]      To whom it may concern:    Lucia RIZVI Tawny is under my professional care for Left wrist Fracture.  She may continue to work in the current cast as tolerated.      Sincerely,        Gus Redd MD

## 2019-07-29 NOTE — PATIENT INSTRUCTIONS
Follow up in 3 weeks for x ray.  Limit pressure to the hand.       Caring for Your Cast     A cast is used to protect an injured body part and allow it to heal by limiting the amount of motion occurring around the injury. Pain and swelling of the injured area is normal for 48 hours after your cast is put on. If you have swelling, wiggle your toes or fingers to ease it. Doing so encourages blood flow to your arm or leg.     It is important that you keep your cast dry, unless your doctor tells you differently. If the padding of the cast gets wet, your skin may be damaged and become infected. When showering or taking a bath, put the cast in a heavy plastic bag that can be held in place with a rubber band. If your cast gets wet and does not dry out in four to five hours, call your doctor s office.   To keep the cast clean, use wash clothes or baby wipes around it.   You may experience some itching inside the cast. This is normal. Avoid putting anything in the cast, even your finger, as you can injure your skin and cause infection. Try shaking some talcum powder or blowing cool air from a hair dryer into the cast to ease itching.   If these signs or symptoms develop, call your doctor immediately.       Pain gets worse     Swelling that cuts off blood flow that does not go away, even when you lift the body part above the level of your heart     Fever after itching. It may be related to an infection.     Fluid draining from your skin under the cast     Your cast may become loose as swelling goes down. If the cast feels too loose or if it is so loose you can take it off, call your doctor s office.     Your doctor or  will give you recommendations for activity based on your injury. Some sports allow casts if properly padded by a doctor or .     For complete healing, your cast should only be removed at the direction of your doctor or clinic staff. A special saw ensures its safe removal and  protects the skin and other tissue under the cast.
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
## 2019-07-29 NOTE — PROGRESS NOTES
Cast/splint application  Date/Time: 7/29/2019 4:09 PM  Performed by: Rigo Santiago PA-C  Authorized by: Gus Redd MD     Pre-procedure details:     Sensation:  Normal  Procedure details:     Laterality:  Left    Location:  Wrist    Cast type:  Short arm    Supplies:  Fiberglass  Post-procedure details:     Pain:  Unchanged    Sensation:  Normal    Patient tolerance of procedure:  Tolerated well, no immediate complications

## 2019-07-30 NOTE — PROGRESS NOTES
Lucia Hector is a 63 year old female seen in consultation at the request of Dr. Lala Lamar  for left distal radius fracture.    She sustained this on 7/1/19 when she  Fell stepping down from a pickup truck.  She initially thought she sprained it, so she did not seek medical attention.  On 7/25/2019 she saw Dr. Lamar.  At that time an x-ray showed a mildly impacted comminuted intra-articular fracture.  She was wearing a soft wrap, and continued this.  She is now seen for definitive treatment.    Other injuries: None.    Past Medical History:   Diagnosis Date     Carcinoma in situ of skin of other and unspecified parts of face 1987    basal cell carcinoma     Other and unspecified ovarian cyst 1983       Past Surgical History:   Procedure Laterality Date     FRACTURE TX, WRIST RT/LT  2010    ORIF   rt     HC REMOVAL OF OVARIAN CYST(S)  1983    laparotomy, 20 wks pregnant, ovarian cyst       Family History   Problem Relation Age of Onset     Diabetes Father      Heart Disease Mother      Respiratory Sister        Social History     Socioeconomic History     Marital status: Single     Spouse name: Not on file     Number of children: Not on file     Years of education: Not on file     Highest education level: Not on file   Occupational History     Not on file   Social Needs     Financial resource strain: Not on file     Food insecurity:     Worry: Not on file     Inability: Not on file     Transportation needs:     Medical: Not on file     Non-medical: Not on file   Tobacco Use     Smoking status: Never Smoker     Smokeless tobacco: Never Used   Substance and Sexual Activity     Alcohol use: No     Drug use: No     Sexual activity: Yes   Lifestyle     Physical activity:     Days per week: Not on file     Minutes per session: Not on file     Stress: Not on file   Relationships     Social connections:     Talks on phone: Not on file     Gets together: Not on file     Attends Cheondoism service: Not on file      Active member of club or organization: Not on file     Attends meetings of clubs or organizations: Not on file     Relationship status: Not on file     Intimate partner violence:     Fear of current or ex partner: Not on file     Emotionally abused: Not on file     Physically abused: Not on file     Forced sexual activity: Not on file   Other Topics Concern     Parent/sibling w/ CABG, MI or angioplasty before 65F 55M? No   Social History Narrative    Caffeine intake/servings daily - 3-4    Calcium intake/servings daily - 3-4    Exercise 7 times weekly - describe power walking, swimming, many things    Sunscreen used - Yes    Seatbelts used - Yes    Guns stored in the home - No    Self Breast Exam - No    Pap test up to date -  Yes    Eye exam up to date -  No    Dental exam up to date -  Yes    DEXA scan up to date -  Not Applicable    Flex Sig/Colonoscopy up to date -  Not Applicable    Mammography up to date -  Yes    Immunizations reviewed and up to date - Yes    Abuse: Current or Past (Physical, Sexual or Emotional) - No    Do you feel safe in your environment - Yes    Do you cope well with stress - Yes    Do you suffer from insomnia - No    Last updated by: Rosita Saleh  8/23/2007        Lives in [facility].  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   Has son (Aspergers) and daughter (Heroin, now recovered).  Daughter and her boyfriend live in her home with her.         Current Outpatient Medications   Medication Sig Dispense Refill     citalopram (CELEXA) 40 MG tablet Take 40 mg by mouth daily       estradiol (ESTRACE) 0.1 MG/GM vaginal cream Place 2 g vaginally twice a week 42.5 g 3     Magnesium Oxide 250 MG TABS        order for DME Equipment being ordered:    L wrist brace 1 each 0     vitamin B complex with vitamin C (VITAMIN  B COMPLEX) tablet Take 1 tablet by mouth daily       Vitamin D, Cholecalciferol, 1000 units CAPS          No Known Allergies    REVIEW OF SYSTEMS:  CONSTITUTIONAL:  NEGATIVE  for fever, chills, change in weight, not feeling tired  SKIN:  NEGATIVE for worrisome rashes, no skin lumps, no skin ulcers and no non-healing wounds  EYES:  NEGATIVE for vision changes or irritation.  ENT/MOUTH:  NEGATIVE.  No hearing loss, no hoarseness, no difficulty swallowing.  RESP:  NEGATIVE. No cough or shortness of breath.  BREAST:  NEGATIVE for masses, tenderness or discharge  CV:  NEGATIVE for chest pain, palpitations or peripheral edema  GI:  NEGATIVE for nausea, abdominal pain, heartburn, or change in bowel habits  :  Negative. No dysuria, no hematuria  MUSCULOSKELETAL:  See HPI above  NEURO:  NEGATIVE . No headaches, no dizziness,  no numbness  ENDOCRINE:  NEGATIVE for temperature intolerance, skin/hair changes  HEME/ALLERGY/IMMUNE:  NEGATIVE for bleeding problems  PSYCHIATRIC:  NEGATIVE. no anxiety, no depression.         Xray images were independently visualized with the patient.  This shows distal radius fracture with no dorsal translation, mild dorsal angulation.  Mild radial shortening of the radial styloid.       Exam:    Shows mild tenderness at left distal radius.   she is using the hand and wrist significantly.  She even uses the hand to push herself around in a chair  mild swelling of forearm, wrist and hand.  full range of motion of fingers.  Sensation, motor, and circulation are intact    Assessment/Plan:  Left distal radius fracture with mild intra-articular displacement.  This is acceptable position.  She needs to stop using the hand for weightbearing  We placed a short arm fiberglass cast with neutral Colles mold.  Return to clinic 3 weeks with xray out of cast.

## 2019-08-15 ENCOUNTER — TELEPHONE (OUTPATIENT)
Dept: FAMILY MEDICINE | Facility: CLINIC | Age: 64
End: 2019-08-15

## 2019-08-15 NOTE — TELEPHONE ENCOUNTER
Panel Management Review      Patient has the following on her problem list:       Composite cancer screening  Chart review shows that this patient is due/due soon for the following Fecal Colorectal (FIT)  Summary:    Patient is due/failing the following:   FIT    Action needed:   Due for a FIT test    Type of outreach:    Sent letter.    Questions for provider review:    None                                                                                                                                    Abida Fang ma       Chart routed to Care Team .

## 2019-08-15 NOTE — LETTER
August 30, 2019    Lucia Hector  [address]  [facility] [address]      Dear Lucia Hector,     We have tried to contact you about your health, but have been unable to reach you.  Please call us as soon as possible so we can provide you with the best care possible.  We will continue to check in with you throughout the year to complete these items of care, if you are not able to complete these items at this time.  If you would like to complete the missing items for your care, please contact us at [phone].    We recommend the following:  -complete a FIT TEST a FIT test or Fecal Immunochemical Occult Blood Test is a take home stool sample kit.  It does not replace the colonoscopy for colorectal cancer screening, but it can detect hidden bleeding in the lower colon.  It does need to be repeated every year and if a positive result is obtained, you would be referred for a colonoscopy.  If you have completed either one of these tests at another facility, please have the records sent to our clinic so that we can best coordinate your care.        Sincerely,     Your Care Team at [facility]  Team 1

## 2019-08-15 NOTE — LETTER
August 15, 2019    Lucia Hector  [address]  [facility] [address]    Dear Lucia    We care about your health and have reviewed your health plan. We have reviewed your medical conditions, medication list, and lab results and are making recommendations based on this review, to better manage your health.    You are in particular need of attention regarding:  - Completing a Colon Cancer Screening (FIT) - Please complete FIT test a screening test for colon cancer. and mail completed test to clinic.      Here is a list of Health Maintenance topics that are due now or due soon:  Health Maintenance Due   Topic Date Due     ZOSTER IMMUNIZATION (1 of 2) 12/29/2007     FIT  08/13/2016     ADVANCE CARE PLANNING  12/13/2016     HPV  11/11/2019     PAP  11/11/2019       Please call us at [phone] (or use [employer]) to address the above recommendations. If we do not hear from you in the next couple of weeks we will be reaching out to you again.    Thank you for trusting [facility] and we appreciate the opportunity to serve you.  We look forward to supporting your healthcare needs in the future.    Healthy Regards,    Dr. Lamar/cw  Team 1

## 2019-08-19 ENCOUNTER — ANCILLARY PROCEDURE (OUTPATIENT)
Dept: GENERAL RADIOLOGY | Facility: CLINIC | Age: 64
End: 2019-08-19
Attending: ORTHOPAEDIC SURGERY
Payer: COMMERCIAL

## 2019-08-19 ENCOUNTER — OFFICE VISIT (OUTPATIENT)
Dept: ORTHOPEDICS | Facility: CLINIC | Age: 64
End: 2019-08-19
Payer: COMMERCIAL

## 2019-08-19 VITALS
RESPIRATION RATE: 16 BRPM | SYSTOLIC BLOOD PRESSURE: 111 MMHG | HEIGHT: 64 IN | BODY MASS INDEX: 26.63 KG/M2 | HEART RATE: 67 BPM | WEIGHT: 156 LBS | DIASTOLIC BLOOD PRESSURE: 70 MMHG

## 2019-08-19 DIAGNOSIS — S52.602A CLOSED FRACTURE OF DISTAL ENDS OF LEFT RADIUS AND ULNA, INITIAL ENCOUNTER: ICD-10-CM

## 2019-08-19 DIAGNOSIS — S52.602D CLOSED FRACTURE OF DISTAL ENDS OF LEFT RADIUS AND ULNA WITH ROUTINE HEALING, SUBSEQUENT ENCOUNTER: Primary | ICD-10-CM

## 2019-08-19 DIAGNOSIS — S52.502A CLOSED FRACTURE OF DISTAL ENDS OF LEFT RADIUS AND ULNA, INITIAL ENCOUNTER: ICD-10-CM

## 2019-08-19 DIAGNOSIS — S52.502D CLOSED FRACTURE OF DISTAL ENDS OF LEFT RADIUS AND ULNA WITH ROUTINE HEALING, SUBSEQUENT ENCOUNTER: Primary | ICD-10-CM

## 2019-08-19 PROCEDURE — 99207 ZZC FRACTURE CARE IN GLOBAL PERIOD: CPT | Performed by: ORTHOPAEDIC SURGERY

## 2019-08-19 PROCEDURE — 73110 X-RAY EXAM OF WRIST: CPT | Mod: LT

## 2019-08-19 ASSESSMENT — MIFFLIN-ST. JEOR: SCORE: 1249.67

## 2019-08-19 NOTE — PROGRESS NOTES
Follow up left distal radius fracture from 7/1/19.  Cast is removed.  Xray:  Compared to previous films there has not been significant interval changes in position; alignment remains acceptable. Progressive healing is seen.  Exam shows no tenderness at distal radius.  She is quite stiff.    Range of Motion  Extension: 30 degrees  Flexion 20 degrees  Pronation 60 degrees.  Supination 60 degrees.  She is able  to make a fist.    Assessment/Plan  Physical therapy 1-2 times per week for 3 weeks.  Return to clinic 3-4 weeks for motion check.

## 2019-08-19 NOTE — LETTER
8/19/2019         RE: Lucia Hector  [address]  [facility] [address]        Dear Colleague,    Thank you for referring your patient, Lucia Hector, to the [facility]. Please see a copy of my visit note below.    Follow up left distal radius fracture from 7/1/19.  Cast is removed.  Xray:  Compared to previous films there has not been significant interval changes in position; alignment remains acceptable. Progressive healing is seen.  Exam shows no tenderness at distal radius.  She is quite stiff.    Range of Motion  Extension: 30 degrees  Flexion 20 degrees  Pronation 60 degrees.  Supination 60 degrees.  She is able  to make a fist.    Assessment/Plan  Physical therapy 1-2 times per week for 3 weeks.  Return to clinic 3-4 weeks for motion check.    Again, thank you for allowing me to participate in the care of your patient.        Sincerely,        Gus Redd MD

## 2019-08-19 NOTE — PATIENT INSTRUCTIONS
Wrist Fracture Rehabilitation Exercises             You may do the stretching exercises when the sharp wrist pain goes away. You may do the strengthening exercises when stretching is nearly painless.     Stretching exercises   Make a fist.  Palm up, palm down.  Chopping.  Waving  Wrist Range of Motion   0. Flexion: Gently bend your wrist forward. Hold for 5 seconds. Do 3 sets of 10.   0. Extension: Gently bend your wrist backward. Hold this position 5 seconds. Do 3 sets of 10.   0. Side to side: Gently move your wrist from side to side (a handshake motion). Hold for 5 seconds in each direction. Do 3 sets of 10.   Wrist stretch: Press the back of the hand on your injured side with your other hand to help bend your wrist. Hold for 15 to 30 seconds. Next, stretch the hand back by pressing the fingers in a backward direction. Hold for 15 to 30 seconds. Keep the arm on your injured side straight during this exercise. Do 3 sets.   Wrist extension stretch: Stand at a table with your palms down, fingers flat, and elbows straight. Lean your body weight forward. Hold this position for 15 seconds. Repeat 3 times.   Wrist flexion stretch: Stand with the back of your hands on a table, palms facing up, fingers pointing toward your body, and elbows straight. Lean away from the table. Hold this position for 15 to 30 seconds. Repeat 3 times.   Forearm pronation and supination: Bend the elbow of your injured arm 90 degrees, keeping your elbow at your side. Turn your palm up and hold for 5 seconds. Then slowly turn your palm down and hold for 5 seconds. Make sure you keep your elbow at your side and bent 90 degrees while you do the exercise. Do 3 sets of 10. When this exercise becomes pain free, do it with some weight in your hand such   as a soup can or hammer handle.   Strengthening exercises   Wrist flexion: Hold a can or hammer handle in your hand with your palm facing up. Bend your wrist upward. Slowly  lower the weight and return to the starting position. Do 3 sets of 10. Gradually increase the weight of the can or weight you are holding.   Wrist extension: Hold a soup can or hammer handle in your hand with your palm facing down. Slowly bend your wrist up. Slowly lower the weight down into the starting position. Do 3 sets of 10. Gradually increase the weight of the object you are holding.    strengthening: Squeeze a soft rubber ball and hold the squeeze for 5 seconds. Do 3 sets of 10.     Published by [employer].  This content is reviewed periodically and is subject to change as new health information becomes available. The information is intended to inform and educate and is not a replacement for medical evaluation, advice, diagnosis or treatment by a healthcare professional.   Written by Amarilis Balderas MS, PT, and Therese Peraza PT, [facility], for [employer].     ? 2010 [facility] and/or its affiliates. All Rights Reserved.   Copyright   [employer] 2011  [employer]ult [employer]

## 2019-08-26 ENCOUNTER — THERAPY VISIT (OUTPATIENT)
Dept: OCCUPATIONAL THERAPY | Facility: CLINIC | Age: 64
End: 2019-08-26
Payer: COMMERCIAL

## 2019-08-26 DIAGNOSIS — M25.632 WRIST STIFFNESS, LEFT: ICD-10-CM

## 2019-08-26 DIAGNOSIS — M25.532 LEFT WRIST PAIN: Primary | ICD-10-CM

## 2019-08-26 PROCEDURE — 97110 THERAPEUTIC EXERCISES: CPT | Mod: GO | Performed by: OCCUPATIONAL THERAPIST

## 2019-08-26 PROCEDURE — 97165 OT EVAL LOW COMPLEX 30 MIN: CPT | Mod: GO | Performed by: OCCUPATIONAL THERAPIST

## 2019-08-26 PROCEDURE — 97760 ORTHOTIC MGMT&TRAING 1ST ENC: CPT | Mod: GO | Performed by: OCCUPATIONAL THERAPIST

## 2019-08-26 NOTE — PROGRESS NOTES
Hand Therapy Initial Evaluation    Current Date:  8/26/2019    Diagnosis: Left distal radius fracture  DOI: 7/1/2019   Referring physician: Gus Redd MD      Subjective:  Lucia Hector is a 63 year old female.    Patient reports symptoms of the left wrist which occurred due to a fall when stepping out of a pickup truck. Since onset symptoms are Gradually getting better.  Special tests:  x-ray.  Previous treatment: Cast, removed 8/19/2019.    General health as reported by patient is good.  Pertinent medical history includes:Concussions/Dizziness, History of Fractures, Menopausal  Medical allergies:none.  Surgical history: orthopedic: Right wrist distal radius fracture.  Medication history: Anti-depressants.    Current occupation is a . Starts work next week.  Currently working in normal job without restrictions  Job Tasks: Driving, Prolonged Sitting  Occupational Profile Information:  Right hand dominant  Prior functional level:  no limitations  Patient reports symptoms of pain, stiffness/loss of motion, weakness/loss of strength and edema  Barriers include:none  Mobility: No difficulty  Transportation: drives  Leisure activities/hobbies: Power walking, riding bike, tennis    Functional Outcome Measure:   Upper Extremity Functional Index Score:  SCORE: 63/80   (A lower score indicates greater disability.)      Objective:  Pain Level (Scale 0-10)   8/26/2019   At Rest 0-1/10   With Use Up to 5/10     Pain Description  Date 8/26/2019   Location wrist   Pain Quality Aching and Sharp   Frequency intermittent     Pain is worst  daytime   Exacerbated by  Twisting, forearm rotation   Relieved by rest   Progression Gradually improving     Sensation  WNL throughout all nerve distributions; per patient report    Edema (Circumference measured in cm)   8/26/2019 8/26/2019    Right Left   DWC 15.0 16.7       ROM  Pain Report:  -none + mild    ++ moderate    +++ severe   Elbow 8/26/2019 8/26/2019   AROM  (PROM) Right Left   Extension WNL WNL   Flexion WNL WNL     Wrist 8/26/2019 8/26/2019   AROM (PROM) Right Left   Extension 60 65   Flexion 55 20   RD 15 5+   UD 20 5+   Supination 80 75 (stiffness)   Pronation 70 (Previous wrist fracture) 75 (stiffness)   Able to fully extend fingers and make a full composite fist with the right hand. Right thumb opposition is 10/10.      Strength  Deferred at this time      Assessment:  Patient presents with symptoms consistent with diagnosis of left wrist distal radius fracture, with non-surgical intervention.     Patient's limitations or Problem List includes:  Pain, Decreased ROM/motion, Increased edema, Decreased  and Decreased pinch of the left wrist which interferes with the patient's ability to perform Work Tasks, Recreational Activities, Household Chores and Driving  as compared to previous level of function.    Rehab Potential:  Excellent - Return to full activity, no limitations    Patient will benefit from skilled Occupational Therapy to increase ROM,  strength and pinch strength and decrease pain and edema to return to previous activity level and resume normal daily tasks and to reach their rehab potential.    Barriers to Learning:  No barrier    Communication Issues:  Patient appears to be able to clearly communicate and understand verbal and written communication and follow directions correctly.    Chart Review: Chart Review    Identified Performance Deficits: hygiene and grooming, driving and community mobility, home establishment and management, meal preparation and cleanup, shopping, work and leisure activities    Assessment of Occupational Performance:  1-3 Performance Deficits    Clinical Decision Making (Complexity): Low complexity    Treatment Explanation:  The following has been discussed with the patient:  RX ordered/plan of care  Anticipated outcomes  Possible risks and side effects    Plan:  Frequency:  1 X week, once daily  Duration:  for 3 weeks  tapering to 2 X a month over 3 months    Treatment Plan:    Modalities:    Fluidotherapy and Paraffin   Therapeutic Exercise:    AROM, AAROM, PROM, Tendon Gliding, Isotonics and Isometrics  Therapeutic Activities:   Functional activities   Manual Techniques:   Joint mobilization and Myofascial release  Orthotic Fabrication:    Forearm-based circumferential wrist orthosis    Discharge Plan:    Achieve all LTG.  Independent in home treatment program.  Reach maximal therapeutic benefit.    Home Program:   Wrist A/AAROM in all planes  Wrist orthosis, wear as needed for heavier activities    Next Visit:  Progress wrist ROM  Ball massage for wrist flexors and extensors  Wrist isometrics and  strengthening when appropriate

## 2019-10-28 PROBLEM — S52.502A CLOSED FRACTURE OF DISTAL ENDS OF LEFT RADIUS AND ULNA: Status: RESOLVED | Noted: 2019-07-29 | Resolved: 2019-10-28

## 2019-10-28 PROBLEM — M25.632 WRIST STIFFNESS, LEFT: Status: RESOLVED | Noted: 2019-08-26 | Resolved: 2019-10-28

## 2019-10-28 PROBLEM — S52.602A CLOSED FRACTURE OF DISTAL ENDS OF LEFT RADIUS AND ULNA: Status: RESOLVED | Noted: 2019-07-29 | Resolved: 2019-10-28

## 2019-10-28 PROBLEM — M25.532 LEFT WRIST PAIN: Status: RESOLVED | Noted: 2019-08-26 | Resolved: 2019-10-28

## 2019-11-11 ENCOUNTER — TELEPHONE (OUTPATIENT)
Dept: FAMILY MEDICINE | Facility: CLINIC | Age: 64
End: 2019-11-11

## 2019-11-11 DIAGNOSIS — Z12.11 SCREEN FOR COLON CANCER: Primary | ICD-10-CM

## 2019-11-11 NOTE — TELEPHONE ENCOUNTER
Panel Management Review  Summary:    Type of outreach:    Phone, spoke to patient.  and she has a physical scheduled for 1/27/20 will complee a pap then. and will mail her a Fit test.    Encounter routed to No Action Needed.                                                                                                                               Abida Fang ma       Panel Management Review      Patient has the following on her problem list:       Composite cancer screening  Chart review shows that this patient is due/due soon for the following Pap Smear and Fecal Colorectal (FIT)  Summary:    Patient is due/failing the following:   FIT and PAP    Action needed:   Due for a Fit test and pap smear     Type of outreach:    Sent letter.    Questions for provider review:    None                                                                                                                                    Abida Fang ma       Chart routed to Care Team .

## 2019-11-11 NOTE — LETTER
November 11, 2019    Lucia Hector  [address]  [facility] [address]    Dear Lucia    We care about your health and have reviewed your health plan. We have reviewed your medical conditions, medication list, and lab results and are making recommendations based on this review, to better manage your health.    You are in particular need of attention regarding:  - Completing a Colon Cancer Screening (FIT) - Please complete FIT test a screening test for colon cancer and mail completed test to clinic.  - Pap smear       Here is a list of Health Maintenance topics that are due now or due soon:  Health Maintenance Due   Topic Date Due     ZOSTER IMMUNIZATION (1 of 2) 12/29/2007     FIT  08/13/2016     ADVANCE CARE PLANNING  12/13/2016     INFLUENZA VACCINE (1) 09/01/2019     HPV  11/11/2019     PAP  11/11/2019       Please call us at [phone] (or use DJZ) to address the above recommendations. If we do not hear from you in the next couple of weeks we will be reaching out to you again.    Thank you for trusting [facility] and we appreciate the opportunity to serve you.  We look forward to supporting your healthcare needs in the future.    Healthy Regards,    Dr. Lamar/cw  Team 1

## 2019-11-26 ENCOUNTER — TELEPHONE (OUTPATIENT)
Dept: FAMILY MEDICINE | Facility: CLINIC | Age: 64
End: 2019-11-26

## 2020-01-03 ENCOUNTER — TELEPHONE (OUTPATIENT)
Dept: FAMILY MEDICINE | Facility: CLINIC | Age: 65
End: 2020-01-03

## 2020-01-03 DIAGNOSIS — R41.89 IMPAIRED COGNITION: ICD-10-CM

## 2020-01-03 DIAGNOSIS — E55.9 VITAMIN D DEFICIENCY: ICD-10-CM

## 2020-01-03 DIAGNOSIS — E78.5 HYPERLIPIDEMIA LDL GOAL <160: Primary | ICD-10-CM

## 2020-01-03 NOTE — TELEPHONE ENCOUNTER
Patient is scheduled for lab appointment on 01/17 and annual physical with PCP on 01/23. Routing to PCP to place lab orders needed for annual physical .

## 2020-01-23 ENCOUNTER — OFFICE VISIT (OUTPATIENT)
Dept: FAMILY MEDICINE | Facility: CLINIC | Age: 65
End: 2020-01-23
Payer: COMMERCIAL

## 2020-01-23 VITALS
WEIGHT: 157 LBS | DIASTOLIC BLOOD PRESSURE: 77 MMHG | HEART RATE: 65 BPM | OXYGEN SATURATION: 100 % | BODY MASS INDEX: 27.82 KG/M2 | SYSTOLIC BLOOD PRESSURE: 149 MMHG | TEMPERATURE: 97.9 F | HEIGHT: 63 IN

## 2020-01-23 DIAGNOSIS — R94.31 NONSPECIFIC ABNORMAL ELECTROCARDIOGRAM (ECG) (EKG): ICD-10-CM

## 2020-01-23 DIAGNOSIS — Z78.0 ASYMPTOMATIC POSTMENOPAUSAL STATE: ICD-10-CM

## 2020-01-23 DIAGNOSIS — F41.1 GENERALIZED ANXIETY DISORDER: ICD-10-CM

## 2020-01-23 DIAGNOSIS — R94.31 QT PROLONGATION: ICD-10-CM

## 2020-01-23 DIAGNOSIS — Z12.4 SCREENING FOR MALIGNANT NEOPLASM OF CERVIX: ICD-10-CM

## 2020-01-23 DIAGNOSIS — E78.5 HYPERLIPIDEMIA LDL GOAL <160: ICD-10-CM

## 2020-01-23 DIAGNOSIS — Z12.31 ENCOUNTER FOR SCREENING MAMMOGRAM FOR BREAST CANCER: ICD-10-CM

## 2020-01-23 DIAGNOSIS — R07.89 ATYPICAL CHEST PAIN: ICD-10-CM

## 2020-01-23 DIAGNOSIS — Z87.898 HISTORY OF EXCESSIVE THIRST: ICD-10-CM

## 2020-01-23 DIAGNOSIS — Z12.11 SPECIAL SCREENING FOR MALIGNANT NEOPLASMS, COLON: ICD-10-CM

## 2020-01-23 DIAGNOSIS — Z00.01 ENCOUNTER FOR GENERAL ADULT MEDICAL EXAMINATION WITH ABNORMAL FINDINGS: Primary | ICD-10-CM

## 2020-01-23 DIAGNOSIS — Z87.81 HISTORY OF WRIST FRACTURE: ICD-10-CM

## 2020-01-23 DIAGNOSIS — R79.89 LOW VITAMIN D LEVEL: ICD-10-CM

## 2020-01-23 DIAGNOSIS — Z00.00 ROUTINE GENERAL MEDICAL EXAMINATION AT A HEALTH CARE FACILITY: ICD-10-CM

## 2020-01-23 LAB
ANION GAP SERPL CALCULATED.3IONS-SCNC: 5 MMOL/L (ref 3–14)
BUN SERPL-MCNC: 18 MG/DL (ref 7–30)
CALCIUM SERPL-MCNC: 9.1 MG/DL (ref 8.5–10.1)
CHLORIDE SERPL-SCNC: 107 MMOL/L (ref 94–109)
CHOLEST SERPL-MCNC: 300 MG/DL
CO2 SERPL-SCNC: 26 MMOL/L (ref 20–32)
CREAT SERPL-MCNC: 0.64 MG/DL (ref 0.52–1.04)
GFR SERPL CREATININE-BSD FRML MDRD: >90 ML/MIN/{1.73_M2}
GLUCOSE SERPL-MCNC: 101 MG/DL (ref 70–99)
HBA1C MFR BLD: 5.4 % (ref 0–5.6)
HDLC SERPL-MCNC: 102 MG/DL
LDLC SERPL CALC-MCNC: 183 MG/DL
NONHDLC SERPL-MCNC: 198 MG/DL
POTASSIUM SERPL-SCNC: 4.1 MMOL/L (ref 3.4–5.3)
SODIUM SERPL-SCNC: 138 MMOL/L (ref 133–144)
TRIGL SERPL-MCNC: 77 MG/DL
TROPONIN I SERPL-MCNC: <0.015 UG/L (ref 0–0.04)
TSH SERPL DL<=0.005 MIU/L-ACNC: 2.64 MU/L (ref 0.4–4)

## 2020-01-23 PROCEDURE — 82306 VITAMIN D 25 HYDROXY: CPT | Performed by: INTERNAL MEDICINE

## 2020-01-23 PROCEDURE — 93000 ELECTROCARDIOGRAM COMPLETE: CPT | Performed by: INTERNAL MEDICINE

## 2020-01-23 PROCEDURE — 84443 ASSAY THYROID STIM HORMONE: CPT | Performed by: INTERNAL MEDICINE

## 2020-01-23 PROCEDURE — 36415 COLL VENOUS BLD VENIPUNCTURE: CPT | Performed by: INTERNAL MEDICINE

## 2020-01-23 PROCEDURE — 87624 HPV HI-RISK TYP POOLED RSLT: CPT | Performed by: INTERNAL MEDICINE

## 2020-01-23 PROCEDURE — 84484 ASSAY OF TROPONIN QUANT: CPT | Performed by: INTERNAL MEDICINE

## 2020-01-23 PROCEDURE — 80061 LIPID PANEL: CPT | Performed by: INTERNAL MEDICINE

## 2020-01-23 PROCEDURE — 83036 HEMOGLOBIN GLYCOSYLATED A1C: CPT | Performed by: INTERNAL MEDICINE

## 2020-01-23 PROCEDURE — 80048 BASIC METABOLIC PNL TOTAL CA: CPT | Performed by: INTERNAL MEDICINE

## 2020-01-23 PROCEDURE — G0145 SCR C/V CYTO,THINLAYER,RESCR: HCPCS | Performed by: INTERNAL MEDICINE

## 2020-01-23 PROCEDURE — 99213 OFFICE O/P EST LOW 20 MIN: CPT | Mod: 25 | Performed by: INTERNAL MEDICINE

## 2020-01-23 PROCEDURE — 99396 PREV VISIT EST AGE 40-64: CPT | Performed by: INTERNAL MEDICINE

## 2020-01-23 ASSESSMENT — ENCOUNTER SYMPTOMS
NERVOUS/ANXIOUS: 0
FREQUENCY: 0
CONSTIPATION: 0
EYE PAIN: 0
CHILLS: 0
FEVER: 0
HEMATURIA: 0
HEMATOCHEZIA: 0
DIARRHEA: 0
DIZZINESS: 0
ABDOMINAL PAIN: 0
COUGH: 0

## 2020-01-23 ASSESSMENT — MIFFLIN-ST. JEOR: SCORE: 1245.24

## 2020-01-23 NOTE — LETTER
January 31, 2020    Lucia Hector  [address]  [facility] [address]    Dear ,  This letter is regarding your recent Pap smear (cervical cancer screening) and Human Papillomavirus (HPV) test.  We are happy to inform you that your Pap smear result is normal. Cervical cancer is closely linked with certain types of HPV. Your results showed no evidence of high-risk HPV.  We recommend you have your next PAP smear and HPV test in 5 years.  You will still need to return to the clinic every year for an annual exam and other preventive tests.  If you have additional questions regarding this result, please call our registered nurse, Karissa at [phone].  Sincerely,    Lala Lamar MD/dana

## 2020-01-23 NOTE — PROGRESS NOTES
"   SUBJECTIVE:   CC: Lucia Hector is an 64 year old woman who presents for preventive health visit.     Healthy Habits:     Getting at least 3 servings of Calcium per day:  Yes    Bi-annual eye exam:  NO    Dental care twice a year:  Yes    Sleep apnea or symptoms of sleep apnea:  None    Diet:  Regular (no restrictions)    Frequency of exercise:  6-7 days/week    Duration of exercise:  45-60 minutes    Taking medications regularly:  Yes    Medication side effects:  Not applicable    PHQ-2 Total Score: 0    Additional concerns today:  No      65 y/o F here for AFE.   H/o VELASQUEZ (citalopram), traumatic brain injury,  and hyperlipidemia.  She has been trying vaginal estrogen for dysparunia, helped a little, plans continued use intermittently  She feels some of her issues are musculoskeletal. .     Over summer, had distal L radius/ulna fx with routine healing, after a fall.   Not on citalopram for a month, kept forgetting to .  \"I don't feel any different\".   One incident of chest pain last month.  Self limited, about 20\", at rest, no radiation, may have awoken her from sleep.  Sat in chair and it resolved on its own.  No associated symptoms . Mid chest.  Can walk 4 miles daily, no chest pain symptoms      Discussed colonoscopy-- worried about perforation FIT??             (QT, h/o citalopram)    No Concerns today     Today's PHQ-2 Score:   PHQ-2 ( 1999 Pf[employer]) 1/23/2020   Q1: Little interest or pleasure in doing things 0   Q2: Feeling down, depressed or hopeless 0   PHQ-2 Score 0   Q1: Little interest or pleasure in doing things Not at all   Q2: Feeling down, depressed or hopeless Not at all   PHQ-2 Score 0       Abuse: Current or Past(Physical, Sexual or Emotional)- No  Do you feel safe in your environment? Yes    Have you ever done Advance Care Planning? (For example, a Health Directive, POLST, or a discussion with a medical provider or your loved ones about your wishes): Patient declined    Social History " "    Tobacco Use     Smoking status: Never Smoker     Smokeless tobacco: Never Used   Substance Use Topics     Alcohol use: No     If you drink alcohol do you typically have >3 drinks per day or >7 drinks per week? NO \"I don't drink\"      Alcohol Use 1/23/2020   Prescreen: >3 drinks/day or >7 drinks/week? Not Applicable   Prescreen: >3 drinks/day or >7 drinks/week? -       Reviewed orders with patient.  Reviewed health maintenance and updated orders accordingly - Yes  Lab work is in process  Labs reviewed in EPIC  BP Readings from Last 3 Encounters:   01/23/20 (!) 149/77   08/19/19 111/70   07/29/19 127/81    Wt Readings from Last 3 Encounters:   01/23/20 71.2 kg (157 lb)   08/19/19 70.8 kg (156 lb)   07/29/19 70.8 kg (156 lb)                  Patient Active Problem List   Diagnosis     BASAL CELL CARCINOMA     Fibroadenosis of breast     Generalized anxiety disorder     Advanced directives, counseling/discussion     Hyperlipidemia LDL goal <160     QT prolongation     Impaired cognition     Traumatic brain injury (H)     Past Surgical History:   Procedure Laterality Date     FRACTURE TX, WRIST RT/LT  2010    ORIF   rt     HC REMOVAL OF OVARIAN CYST(S)  1983    laparotomy, 20 wks pregnant, ovarian cyst       Social History     Tobacco Use     Smoking status: Never Smoker     Smokeless tobacco: Never Used   Substance Use Topics     Alcohol use: No     Family History   Problem Relation Age of Onset     Diabetes Father      Heart Disease Mother      Respiratory Sister          Current Outpatient Medications   Medication Sig Dispense Refill     estradiol (ESTRACE) 0.1 MG/GM vaginal cream Place 2 g vaginally twice a week 42.5 g 3     Magnesium Oxide 250 MG TABS        order for DME Equipment being ordered:    L wrist brace 1 each 0     vitamin B complex with vitamin C (VITAMIN  B COMPLEX) tablet Take 1 tablet by mouth daily       Vitamin D, Cholecalciferol, 1000 units CAPS        No Known Allergies  Recent Labs   Lab Test " 01/21/19  0841 01/08/18  1003 12/28/16  0738  02/26/13  1053   A1C  --   --   --   --  5.5   * 136* 113*   < > 136*    105 96   < > 93   TRIG 54 65 36   < > 66   ALT  --   --  26  --  38   CR 0.72 0.67 0.77   < > 0.75   GFRESTIMATED 90 90 77   < > 80   GFRESTBLACK >90 >90 >90  African American GFR Calc     < > >90   POTASSIUM 4.0 3.8 4.1   < > 3.8   TSH  --  2.42 2.48  --  1.41    < > = values in this interval not displayed.        Mammogram Screening: Patient over age 50, mutual decision to screen reflected in health maintenance.    Pertinent mammograms are reviewed under the imaging tab.  History of abnormal Pap smear:   Last 3 Pap Results:   PAP (no units)   Date Value   11/11/2014 NIL   09/07/2011 NIL   08/12/2008 NIL     PAP / HPV 11/11/2014 9/7/2011 8/12/2008   PAP NIL NIL NIL     Reviewed and updated as needed this visit by clinical staff         Reviewed and updated as needed this visit by Provider        Past Medical History:   Diagnosis Date     Carcinoma in situ of skin of other and unspecified parts of face 1987    basal cell carcinoma     Other and unspecified ovarian cyst 1983      Past Surgical History:   Procedure Laterality Date     FRACTURE TX, WRIST RT/LT  2010    ORIF   rt     HC REMOVAL OF OVARIAN CYST(S)  1983    laparotomy, 20 wks pregnant, ovarian cyst       Review of Systems   Constitutional: Negative for chills and fever.   HENT: Negative for congestion and ear pain.    Eyes: Negative for pain.   Respiratory: Negative for cough.    Cardiovascular: Negative for chest pain.   Gastrointestinal: Negative for abdominal pain, constipation, diarrhea and hematochezia.   Genitourinary: Negative for frequency and hematuria.   Neurological: Negative for dizziness.   Psychiatric/Behavioral: The patient is not nervous/anxious.            OBJECTIVE:   LMP 07/12/2008   Physical Exam     GENERAL APPEARANCE: healthy, alert and no distress  EYES: Eyes grossly normal to inspection, PERRL and  conjunctivae and sclerae normal  HENT: ear canals and TM's normal, nose and mouth without ulcers or lesions, oropharynx clear and oral mucous membranes moist  NECK: no adenopathy, no asymmetry, masses, or scars and thyroid normal to palpation  RESP: lungs clear to auscultation - no rales, rhonchi or wheezes  BREAST: normal without masses, tenderness or nipple discharge and no palpable axillary masses or adenopathy  CV: regular rate and rhythm, normal S1 S2, no S3 or S4, no murmur, click or rub, no peripheral edema and peripheral pulses strong  ABDOMEN: soft, nontender, no hepatosplenomegaly, no masses and bowel sounds normal   (female): normal female external genitalia, normal urethral meatus, vaginal mucosal atrophy noted, normal cervix, adnexae, and uterus without masses or abnormal discharge   (female): normal female external genitalia, normal urethral meatus, vaginal mucosal atrophy noted, normal cervix, adnexae, and uterus without masses. and PAP done with fair view of cervix.   MS: no musculoskeletal defects are noted and gait is age appropriate without ataxia  SKIN: no suspicious lesions or rashes  NEURO: Normal strength and tone, sensory exam grossly normal, mentation intact and speech normal  PSYCH: mentation appears normal and affect normal/bright  Circumstantial speech    Diagnostic Test Results:  Labs reviewed in Epic  See orders    ASSESSMENT/PLAN:       ICD-10-CM    1. Encounter for general adult medical examination with abnormal findings Z00.01    2. Hyperlipidemia LDL goal <160 E78.5 Lipid panel reflex to direct LDL Fasting   3. History of wrist fracture Z87.81    4. Screening for malignant neoplasm of cervix Z12.4 Pap imaged thin layer screen with HPV - recommended age 30 - 65 years (select HPV order below)     HPV High Risk Types DNA Cervical   5. Generalized anxiety disorder F41.1    6. QT prolongation R94.31 EKG 12-lead complete w/read - Clinics   7. Routine general medical examination at a  "health care facility Z00.00    8. Atypical chest pain R07.89    9. Low vitamin D level R79.89 Vitamin D Deficiency   10. Special screening for malignant neoplasms, colon Z12.11 Fecal colorectal cancer screen (FIT)   11. Asymptomatic postmenopausal state Z78.0 DX Hip/Pelvis/Spine   12. Encounter for screening mammogram for breast cancer Z12.31 *MA Screening Digital Bilateral   13. History of excessive thirst Z87.898 Hemoglobin A1c   14. Nonspecific abnormal electrocardiogram (ECG) (EKG) R94.31 Basic metabolic panel     TSH with free T4 reflex     CARDIOLOGY EVAL ADULT REFERRAL     Troponin I     CANCELED: CARDIOLOGY EVAL ADULT REFERRAL       COUNSELING:  Reviewed preventive health counseling, as reflected in patient instructions       Immunizations    Declined: Influenza and Zoster due to Concerns about side effects/safety        At end of visit, requests DM screening due to  Thirst, frequency      Needs HPSP form done.  THis is for yearly eval.  She gets this done quarterly via psychiatrist.     Declines vaccinations.     Her EKG has deep T waves. Asking Cardiology to assess.  Labs added.     Estimated body mass index is 27.2 kg/m  as calculated from the following:    Height as of 8/19/19: 1.613 m (5' 3.5\").    Weight as of 8/19/19: 70.8 kg (156 lb).         reports that she has never smoked. She has never used smokeless tobacco.      Counseling Resources:  ATP IV Guidelines  Pooled Cohorts Equation Calculator  Breast Cancer Risk Calculator  FRAX Risk Assessment  ICSI Preventive Guidelines  Dietary Guidelines for Americans, 2010  USDA's MyPlate  ASA Prophylaxis  Lung CA Screening    Lala Lamar MD  [facility]  Will check ekg due to recent unprovoked chest pain.   COUNSELING:   Reviewed preventive health counseling, as reflected in patient instructions       Immunizations    Declined: Influenza and Zoster due to Concerns about side effects/safety            Estimated body mass index is " "27.2 kg/m  as calculated from the following:    Height as of 8/19/19: 1.613 m (5' 3.5\").    Weight as of 8/19/19: 70.8 kg (156 lb).         reports that she has never smoked. She has never used smokeless tobacco.      Counseling Resources:  ATP IV Guidelines  Pooled Cohorts Equation Calculator  Breast Cancer Risk Calculator  FRAX Risk Assessment  ICSI Preventive Guidelines  Dietary Guidelines for Americans, 2010  USDA's MyPlate  ASA Prophylaxis  Lung CA Screening    Lala Lamar MD  [facility]  "

## 2020-01-23 NOTE — PATIENT INSTRUCTIONS

## 2020-01-24 LAB — DEPRECATED CALCIDIOL+CALCIFEROL SERPL-MC: 19 UG/L (ref 20–75)

## 2020-01-27 NOTE — RESULT ENCOUNTER NOTE
Lucia Hector    The cholesterol is quite elevated so you are eligible for treatment to help lower these numbers. Let me know if you would be willing to try a daily statin medication for this.  The vitamin D level is quite low:  Plan to take over the counter vitamin D, 5000 IU daily for the next several months, then you could continue the dose at 2000 IU daily at the start of summer.     Screening for diabetes and the thyroid check are normal.  Electrolytes/kidney function are normal    Sincerely,     DONELL ALBA M.D.

## 2020-01-28 LAB
COPATH REPORT: NORMAL
PAP: NORMAL

## 2020-01-29 LAB
FINAL DIAGNOSIS: NORMAL
HPV HR 12 DNA CVX QL NAA+PROBE: NEGATIVE
HPV16 DNA SPEC QL NAA+PROBE: NEGATIVE
HPV18 DNA SPEC QL NAA+PROBE: NEGATIVE
SPECIMEN DESCRIPTION: NORMAL
SPECIMEN SOURCE CVX/VAG CYTO: NORMAL

## 2020-02-24 ENCOUNTER — ANCILLARY PROCEDURE (OUTPATIENT)
Dept: MAMMOGRAPHY | Facility: CLINIC | Age: 65
End: 2020-02-24
Attending: INTERNAL MEDICINE
Payer: COMMERCIAL

## 2020-02-24 DIAGNOSIS — Z12.31 ENCOUNTER FOR SCREENING MAMMOGRAM FOR BREAST CANCER: ICD-10-CM

## 2020-02-24 PROCEDURE — 77067 SCR MAMMO BI INCL CAD: CPT | Mod: TC

## 2020-02-24 PROCEDURE — 77063 BREAST TOMOSYNTHESIS BI: CPT | Mod: TC

## 2020-02-26 ENCOUNTER — TELEPHONE (OUTPATIENT)
Dept: FAMILY MEDICINE | Facility: CLINIC | Age: 65
End: 2020-02-26

## 2020-02-26 NOTE — TELEPHONE ENCOUNTER
Panel Management Review      Patient has the following on her problem list:       Composite cancer screening  Chart review shows that this patient is due/due soon for the following Fecal Colorectal (FIT)  Summary:    Patient is due/failing the following:   FIT    Action needed:   Due for a Fit test     Type of outreach:    Afit test was ordered on 1/23/2020    Questions for provider review:    None                                                                                                                                    Abida Fang ma       Chart routed to closing chart  .

## 2020-11-15 ENCOUNTER — TRANSFERRED RECORDS (OUTPATIENT)
Dept: HEALTH INFORMATION MANAGEMENT | Facility: CLINIC | Age: 65
End: 2020-11-15

## 2021-01-15 ENCOUNTER — HEALTH MAINTENANCE LETTER (OUTPATIENT)
Age: 66
End: 2021-01-15

## 2021-01-25 ENCOUNTER — OFFICE VISIT (OUTPATIENT)
Dept: INTERNAL MEDICINE | Facility: CLINIC | Age: 66
End: 2021-01-25
Payer: COMMERCIAL

## 2021-01-25 VITALS
OXYGEN SATURATION: 98 % | DIASTOLIC BLOOD PRESSURE: 82 MMHG | TEMPERATURE: 97.8 F | HEART RATE: 59 BPM | HEIGHT: 64 IN | BODY MASS INDEX: 25.61 KG/M2 | SYSTOLIC BLOOD PRESSURE: 138 MMHG | WEIGHT: 150 LBS

## 2021-01-25 DIAGNOSIS — Z23 NEED FOR IMMUNIZATION AGAINST TETANUS ALONE: ICD-10-CM

## 2021-01-25 DIAGNOSIS — Z00.01 ENCOUNTER FOR GENERAL ADULT MEDICAL EXAMINATION WITH ABNORMAL FINDINGS: Primary | ICD-10-CM

## 2021-01-25 DIAGNOSIS — Z12.12 SCREENING FOR COLORECTAL CANCER: ICD-10-CM

## 2021-01-25 DIAGNOSIS — Z00.00 ROUTINE GENERAL MEDICAL EXAMINATION AT A HEALTH CARE FACILITY: ICD-10-CM

## 2021-01-25 DIAGNOSIS — R73.09 ELEVATED GLUCOSE: ICD-10-CM

## 2021-01-25 DIAGNOSIS — R79.89 LOW VITAMIN D LEVEL: ICD-10-CM

## 2021-01-25 DIAGNOSIS — Z12.11 SCREENING FOR COLORECTAL CANCER: ICD-10-CM

## 2021-01-25 DIAGNOSIS — Z12.31 ENCOUNTER FOR SCREENING MAMMOGRAM FOR BREAST CANCER: ICD-10-CM

## 2021-01-25 LAB
ANION GAP SERPL CALCULATED.3IONS-SCNC: 5 MMOL/L (ref 3–14)
BUN SERPL-MCNC: 19 MG/DL (ref 7–30)
CALCIUM SERPL-MCNC: 9.2 MG/DL (ref 8.5–10.1)
CHLORIDE SERPL-SCNC: 105 MMOL/L (ref 94–109)
CO2 SERPL-SCNC: 30 MMOL/L (ref 20–32)
CREAT SERPL-MCNC: 0.69 MG/DL (ref 0.52–1.04)
GFR SERPL CREATININE-BSD FRML MDRD: >90 ML/MIN/{1.73_M2}
GLUCOSE SERPL-MCNC: 97 MG/DL (ref 70–99)
POTASSIUM SERPL-SCNC: 3.8 MMOL/L (ref 3.4–5.3)
SODIUM SERPL-SCNC: 140 MMOL/L (ref 133–144)

## 2021-01-25 PROCEDURE — 80048 BASIC METABOLIC PNL TOTAL CA: CPT | Performed by: INTERNAL MEDICINE

## 2021-01-25 PROCEDURE — 90714 TD VACC NO PRESV 7 YRS+ IM: CPT | Performed by: INTERNAL MEDICINE

## 2021-01-25 PROCEDURE — 36415 COLL VENOUS BLD VENIPUNCTURE: CPT | Performed by: INTERNAL MEDICINE

## 2021-01-25 PROCEDURE — 82306 VITAMIN D 25 HYDROXY: CPT | Performed by: INTERNAL MEDICINE

## 2021-01-25 PROCEDURE — 99396 PREV VISIT EST AGE 40-64: CPT | Mod: 25 | Performed by: INTERNAL MEDICINE

## 2021-01-25 PROCEDURE — 90471 IMMUNIZATION ADMIN: CPT | Performed by: INTERNAL MEDICINE

## 2021-01-25 ASSESSMENT — ENCOUNTER SYMPTOMS
HEMATOCHEZIA: 0
CHILLS: 0
HEMATURIA: 0
DIARRHEA: 0
NERVOUS/ANXIOUS: 0
COUGH: 0
FREQUENCY: 0
CONSTIPATION: 0
DIZZINESS: 0
EYE PAIN: 0
ABDOMINAL PAIN: 0
FEVER: 0

## 2021-01-25 ASSESSMENT — MIFFLIN-ST. JEOR: SCORE: 1216.43

## 2021-01-25 NOTE — PROGRESS NOTES
SUBJECTIVE:   CC: Lucia Hector is an 65 year old woman who presents for preventive health visit.         66 y/o F here for AFE.   H/o hyperlipidemia, Low Vit D, VELASQUEZ, TBI, VELASQUEZ.         Her anxiety has been controlled, doing better lately.        Patient has been advised of split billing requirements and indicates understanding: Yes  Healthy Habits:     Getting at least 3 servings of Calcium per day:  Yes    Bi-annual eye exam:  NO    Dental care twice a year:  Yes    Sleep apnea or symptoms of sleep apnea:  None    Diet:  Regular (no restrictions) and Carbohydrate counting    Frequency of exercise:  6-7 days/week    Duration of exercise:  45-60 minutes    Taking medications regularly:  Not Applicable    Medication side effects:  Not applicable    PHQ-2 Total Score: 0    Additional concerns today:  No      No Concerns     Today's PHQ-2 Score:   PHQ-2 ( 1999 Pfizer) 1/25/2021   Q1: Little interest or pleasure in doing things 0   Q2: Feeling down, depressed or hopeless 0   PHQ-2 Score 0   Q1: Little interest or pleasure in doing things Not at all   Q2: Feeling down, depressed or hopeless Not at all   PHQ-2 Score 0       Abuse: Current or Past (Physical, Sexual or Emotional) - No  Do you feel safe in your environment? Yes    Have you ever done Advance Care Planning? (For example, a Health Directive, POLST, or a discussion with a medical provider or your loved ones about your wishes): No, advance care planning information given to patient to review.  Patient plans to discuss their wishes with loved ones or provider.      Social History     Tobacco Use     Smoking status: Never Smoker     Smokeless tobacco: Never Used   Substance Use Topics     Alcohol use: No     If you drink alcohol do you typically have >3 drinks per day or >7 drinks per week? No    Alcohol Use 1/25/2021   Prescreen: >3 drinks/day or >7 drinks/week? Not Applicable   Prescreen: >3 drinks/day or >7 drinks/week? -       Any new diagnosis of family  breast, ovarian, or bowel cancer? No      Reviewed orders with patient.  Reviewed health maintenance and updated orders accordingly - Yes  Lab work is in process  Labs reviewed in EPIC  BP Readings from Last 3 Encounters:   01/25/21 138/82   01/23/20 (!) 149/77   08/19/19 111/70    Wt Readings from Last 3 Encounters:   01/25/21 68 kg (150 lb)   01/23/20 71.2 kg (157 lb)   08/19/19 70.8 kg (156 lb)                  Patient Active Problem List   Diagnosis     BASAL CELL CARCINOMA     Fibroadenosis of breast     Generalized anxiety disorder     Advanced directives, counseling/discussion     Hyperlipidemia LDL goal <160     QT prolongation     Impaired cognition     Traumatic brain injury (H)     Past Surgical History:   Procedure Laterality Date     FRACTURE TX, WRIST RT/LT  2010    ORIF   rt     HC REMOVAL OF OVARIAN CYST(S)  1983    laparotomy, 20 wks pregnant, ovarian cyst       Social History     Tobacco Use     Smoking status: Never Smoker     Smokeless tobacco: Never Used   Substance Use Topics     Alcohol use: No     Family History   Problem Relation Age of Onset     Diabetes Father      Heart Disease Mother      Respiratory Sister          Current Outpatient Medications   Medication Sig Dispense Refill     estradiol (ESTRACE) 0.1 MG/GM vaginal cream Place 2 g vaginally twice a week (Patient not taking: Reported on 1/25/2021) 42.5 g 3     Magnesium Oxide 250 MG TABS        vitamin B complex with vitamin C (VITAMIN  B COMPLEX) tablet Take 1 tablet by mouth daily       Vitamin D, Cholecalciferol, 1000 units CAPS        No Known Allergies  Recent Labs   Lab Test 01/23/20  1123 01/21/19  0841 01/08/18  1003 12/28/16  0738 02/26/13  1053 02/26/13  1053   A1C 5.4  --   --   --   --  5.5   * 121* 136* 113*   < > 136*    100 105 96   < > 93   TRIG 77 54 65 36   < > 66   ALT  --   --   --  26  --  38   CR 0.64 0.72 0.67 0.77   < > 0.75   GFRESTIMATED >90 90 90 77   < > 80   GFRESTBLACK >90 >90 >90  >90   GFR Calc     < > >90   POTASSIUM 4.1 4.0 3.8 4.1   < > 3.8   TSH 2.64  --  2.42 2.48  --  1.41    < > = values in this interval not displayed.        Breast CA Risk Screening:  No flowsheet data found.    click delete button to remove this line now  Mammogram Screening: Recommended mammography every 1-2 years with patient discussion and risk factor consideration  Pertinent mammograms are reviewed under the imaging tab.    History of abnormal Pap smear:   Last 3 Pap Results:   PAP (no units)   Date Value   01/23/2020 NIL   11/11/2014 NIL   09/07/2011 NIL     PAP / HPV Latest Ref Rng & Units 1/23/2020 11/11/2014 9/7/2011   PAP - NIL NIL NIL   HPV 16 DNA NEG:Negative Negative - -   HPV 18 DNA NEG:Negative Negative - -   OTHER HR HPV NEG:Negative Negative - -     Reviewed and updated as needed this visit by clinical staff                 Reviewed and updated as needed this visit by Provider                Past Medical History:   Diagnosis Date     Carcinoma in situ of skin of other and unspecified parts of face 1987    basal cell carcinoma     Other and unspecified ovarian cyst 1983      Past Surgical History:   Procedure Laterality Date     FRACTURE TX, WRIST RT/LT  2010    ORIF   rt     HC REMOVAL OF OVARIAN CYST(S)  1983    laparotomy, 20 wks pregnant, ovarian cyst       Review of Systems   Constitutional: Negative for chills and fever.   HENT: Negative for congestion and ear pain.    Eyes: Negative for pain.   Respiratory: Negative for cough.    Cardiovascular: Negative for chest pain.   Gastrointestinal: Negative for abdominal pain, constipation, diarrhea and hematochezia.   Genitourinary: Negative for frequency, genital sores and hematuria.   Neurological: Negative for dizziness.   Psychiatric/Behavioral: The patient is not nervous/anxious.            OBJECTIVE:   LMP 07/12/2008    /82 (BP Location: Right arm, Patient Position: Sitting)   Pulse 59   Temp 97.8  F (36.6  C) (Oral)   Ht  "1.619 m (5' 3.75\")   Wt 68 kg (150 lb)   LMP 07/12/2008   SpO2 98%   BMI 25.95 kg/m      Physical Exam  GENERAL: healthy, alert and no distress  EYES: Eyes grossly normal to inspection, PERRL and conjunctivae and sclerae normal  HENT: ear canals and TM's normal, nose and mouth without ulcers or lesions  NECK: no adenopathy, no asymmetry, masses, or scars and thyroid normal to palpation  RESP: lungs clear to auscultation - no rales, rhonchi or wheezes  BREAST: normal without masses, tenderness or nipple discharge and no palpable axillary masses or adenopathy  CV: regular rate and rhythm, normal S1 S2, no S3 or S4, no murmur, click or rub, no peripheral edema and peripheral pulses strong  ABDOMEN: soft, nontender, no hepatosplenomegaly, no masses and bowel sounds normal   (female):defer  MS: no gross musculoskeletal defects noted, no edema  SKIN: no suspicious lesions or rashes  NEURO: Normal strength and tone, mentation intact and speech normal  PSYCH: mentation appears normal, affect normal/bright  PSYCH: 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      Diagnostic Test Results:  Labs reviewed in Epic  No results found for this or any previous visit (from the past 24 hour(s)).    ASSESSMENT/PLAN:       ICD-10-CM    1. Encounter for general adult medical examination with abnormal findings  Z00.01    2. Low vitamin D level  R79.89 Vitamin D Deficiency     vitamin D2 (ERGOCALCIFEROL) 23454 units (1250 mcg) capsule   3. Elevated glucose  R73.09 Basic metabolic panel   4. Routine general medical examination at a health care facility  Z00.00    5. Need for immunization against tetanus alone  Z23 TD PRESERV FREE, IM (7+ YRS)   6. Screening for colorectal cancer  Z12.11 GASTROENTEROLOGY ADULT REF PROCEDURE ONLY    Z12.12    7. Encounter for screening mammogram for breast cancer  Z12.31 *MA Screening Digital Bilateral " "       FORMS completed for HSPS  LABS pending,     Later Entry: Vit D level only 19     Rx 50K units weekly vit D for 3 months sent to her pharmacy.  Note sent.     Patient has been advised of split billing requirements and indicates understanding: Yes  COUNSELING:  Reviewed preventive health counseling, as reflected in patient instructions    Estimated body mass index is 27.59 kg/m  as calculated from the following:    Height as of 1/23/20: 1.607 m (5' 3.25\").    Weight as of 1/23/20: 71.2 kg (157 lb).        She reports that she has never smoked. She has never used smokeless tobacco.      Counseling Resources:  ATP IV Guidelines  Pooled Cohorts Equation Calculator  Breast Cancer Risk Calculator  BRCA-Related Cancer Risk Assessment: FHS-7 Tool  FRAX Risk Assessment  ICSI Preventive Guidelines  Dietary Guidelines for Americans, 2010  USDA's MyPlate  ASA Prophylaxis  Lung CA Screening    Lala Lamar MD  [facility]  "

## 2021-01-25 NOTE — PATIENT INSTRUCTIONS

## 2021-01-25 NOTE — NURSING NOTE
Prior to immunization administration, verified patients identity using patient s name and date of birth. Please see Immunization Activity for additional information.     Screening Questionnaire for Adult Immunization    Are you sick today?   No   Do you have allergies to medications, food, a vaccine component or latex?   No   Have you ever had a serious reaction after receiving a vaccination?   No   Do you have a long-term health problem with heart, lung, kidney, or metabolic disease (e.g., diabetes), asthma, a blood disorder, no spleen, complement component deficiency, a cochlear implant, or a spinal fluid leak?  Are you on long-term aspirin therapy?   No   Do you have cancer, leukemia, HIV/AIDS, or any other immune system problem?   No   Do you have a parent, brother, or sister with an immune system problem?   No   In the past 3 months, have you taken medications that affect  your immune system, such as prednisone, other steroids, or anticancer drugs; drugs for the treatment of rheumatoid arthritis, Crohn s disease, or psoriasis; or have you had radiation treatments?   No   Have you had a seizure, or a brain or other nervous system problem?   No   During the past year, have you received a transfusion of blood or blood    products, or been given immune (gamma) globulin or antiviral drug?   No   For women: Are you pregnant or is there a chance you could become       pregnant during the next month?   No   Have you received any vaccinations in the past 4 weeks?   No     Immunization questionnaire answers were all negative.        Per orders of Dr. Lamar, injection of Td given by Abida Fang CMA. Patient instructed to remain in clinic for 15 minutes afterwards, and to report any adverse reaction to me immediately.       Screening performed by Abida Fang CMA on 1/25/2021 at 11:30 AM.

## 2021-01-26 LAB — DEPRECATED CALCIDIOL+CALCIFEROL SERPL-MC: 19 UG/L (ref 20–75)

## 2021-01-26 RX ORDER — ERGOCALCIFEROL 1.25 MG/1
50000 CAPSULE, LIQUID FILLED ORAL WEEKLY
Qty: 12 CAPSULE | Refills: 0 | Status: SHIPPED | OUTPATIENT
Start: 2021-01-26 | End: 2021-04-26

## 2021-01-26 NOTE — RESULT ENCOUNTER NOTE
Lucia Hector      The vitamin D level is quite low.  I sent an Rx for you to take an extra 50,000 IU vitamin D weekly for 3 months, to boost your levels.      In summer, get a little bit of sun if you can, this will really boost the levels as well.     It was nice to see you, I look forward to seeing you next year.     Sincerely,       DONELL ALBA M.D.     Send note

## 2021-01-28 ENCOUNTER — TELEPHONE (OUTPATIENT)
Dept: INTERNAL MEDICINE | Facility: CLINIC | Age: 66
End: 2021-01-28

## 2021-01-28 NOTE — TELEPHONE ENCOUNTER
Prior Authorization Retail Medication Request    Medication/Dose: 1250 mcg  ICD code (if different than what is on RX):  Low vitamin D level [R79.89]       Insurance Name:  [facility]  Insurance ID:  [id number]       Pharmacy Information (if different than what is on RX)  Name:  [facility]  Phone:  [phone]

## 2021-01-30 NOTE — TELEPHONE ENCOUNTER
PA Initiation    Medication: vitamin D2 (ERGOCALCIFEROL) 54422 units (1250 mcg) capsule  Insurance Company: [employer] - Phone [phone] Fax [phone]  Pharmacy Filling the Rx: [facility] [address] [place] [address] - [address] [place] [address]  Filling Pharmacy Phone: [phone]  Filling Pharmacy Fax: [phone]  Start Date: 1/30/2021

## 2021-02-01 NOTE — TELEPHONE ENCOUNTER
Prior Authorization Approval    Authorization Effective Date: 12/31/2020  Authorization Expiration Date: 1/30/2022  Medication: vitamin D2 (ERGOCALCIFEROL) 95710 units (1250 mcg) capsule  Approved Dose/Quantity:   Reference #: [id number]   Insurance Company: [employer] - Phone [phone] Fax [phone]  Expected CoPay:       CoPay Card Available:      Foundation Assistance Needed:    Which Pharmacy is filling the prescription (Not needed for infusion/clinic administered): [facility] [address] [place] [address] - [address] [place] [address]  Pharmacy Notified: Yes  Patient Notified: Yes, **Instructed pharmacy to notify patient when script is ready to /ship.**

## 2021-05-27 ENCOUNTER — RECORDS - HEALTHEAST (OUTPATIENT)
Dept: ADMINISTRATIVE | Facility: CLINIC | Age: 66
End: 2021-05-27

## 2021-07-22 NOTE — PROGRESS NOTES
Assessment & Plan   Problem List Items Addressed This Visit     None      Visit Diagnoses     Rash    -  Primary    Relevant Medications    doxycycline hyclate (VIBRAMYCIN) 100 MG capsule         Patient did not see a tick on her leg but is concerned this is a tick bite. Will treat with prophylactic dose of doxycycline as this is relatively low risk to her. Rash not consistent with EM rash. No other symptoms. Follow up as needed.    Complete history and physical exam as below. AF with normal VS.    DDx and Dx discussed with and explained to the pt to their satisfaction.  All questions were answered at this time. Pt expressed understanding of and agreement with this dx, tx, and plan. No further workup warranted and standard medication warnings given. I have given the patient a list of pertinent indications for re-evaluation. Will go to the Emergency Department if symptoms worsen or new concerning symptoms arise. Patient left in no apparent distress.     20 minutes spent on the date of the encounter doing chart review, history and exam, documentation and further activities per the note    See Patient Instructions    Return for a recheck as needed, or call 911/go to an ER anytime if worsening.    CHRISTIANE Lopez  [facility] DARWIN Myers is a 65 year old who presents for the following health issues   HPI     Has signs and symptoms that she was bitten from a tick on the right leg. No tick was seen. Right leg calf. Noticed it two days ago. Wants an antibiotic.  No itching, fever, bullseye rash, arthralgia or painful rash. Fatigued since yesterday.     Review of Systems   Constitutional, HEENT, cardiovascular, pulmonary, gi and gu systems are negative, except as otherwise noted.      Objective    /76   Pulse 77   Temp 98  F (36.7  C) (Tympanic)   Resp 14   Wt 63.3 kg (139 lb 9.6 oz)   LMP 07/12/2008   SpO2 96%   BMI 24.15 kg/m    Body mass index is 24.15  kg/m .  Physical Exam  Vitals and nursing note reviewed.   Constitutional:       General: She is not in acute distress.     Appearance: Normal appearance. She is not diaphoretic.   HENT:      Head: Normocephalic and atraumatic.      Nose: Nose normal.   Eyes:      Conjunctiva/sclera: Conjunctivae normal.   Pulmonary:      Effort: Pulmonary effort is normal. No respiratory distress.   Skin:     General: Skin is dry.      Coloration: Skin is not jaundiced or pale.      Comments: 1cm macule erythema to the anterior right ankle.   Neurological:      General: No focal deficit present.      Mental Status: She is alert. Mental status is at baseline.   Psychiatric:         Mood and Affect: Mood normal.         Behavior: Behavior normal.

## 2021-07-23 ENCOUNTER — OFFICE VISIT (OUTPATIENT)
Dept: FAMILY MEDICINE | Facility: CLINIC | Age: 66
End: 2021-07-23
Payer: COMMERCIAL

## 2021-07-23 VITALS
SYSTOLIC BLOOD PRESSURE: 121 MMHG | DIASTOLIC BLOOD PRESSURE: 76 MMHG | BODY MASS INDEX: 24.15 KG/M2 | OXYGEN SATURATION: 96 % | TEMPERATURE: 98 F | WEIGHT: 139.6 LBS | RESPIRATION RATE: 14 BRPM | HEART RATE: 77 BPM

## 2021-07-23 DIAGNOSIS — R21 RASH: Primary | ICD-10-CM

## 2021-07-23 PROCEDURE — 99213 OFFICE O/P EST LOW 20 MIN: CPT | Performed by: PHYSICIAN ASSISTANT

## 2021-07-23 RX ORDER — DOXYCYCLINE 100 MG/1
200 CAPSULE ORAL ONCE
Qty: 2 CAPSULE | Refills: 0 | Status: SHIPPED | OUTPATIENT
Start: 2021-07-23 | End: 2021-07-23

## 2021-07-23 NOTE — PATIENT INSTRUCTIONS
Ang Myers,    Thank you for allowing [facility] to manage your care.    I sent your prescriptions to your pharmacy.    Skip your magnesium the day you take the doxycycline.    If you have any questions or concerns, please feel free to call us at [phone]    Sincerely,    Sandeep Glasgow PA-C    Did you know?      You can schedule a video visit for follow-up appointments as well as future appointments for certain conditions.  Please see the below link.     [email or web address]    If you have not already done so,  I encourage you to sign up for [facility] ([email or web address]).  This will allow you to review your results, securely communicate with a provider, and schedule virtual visits as well.     Patient Education     Tick Bite, Antibiotic Treatment     Ticks are small arachnids that feed on the blood of rodents, rabbits, birds, deer, dogs and humans. The bite may cause a small reaction like that of a spider, with a small amount of limited redness, itching and slight swelling. Sometimes there is no local reaction.  Most tick bites are harmless. But some ticks carry diseases, such as Lyme disease or Jose Mountain spotted fever. These can be passed to people at the time of the bite. Lyme disease is of greatest concern. Right now you have no symptoms of Lyme disease or other serious reaction to the bite. It's important to watch for the warning signs, which could appear weeks to months after the tick bite.  Home care  These guidelines can help you care for your bite at home:    If itching is a problem, don't wear tight clothing or anything that heats up your skin. This includes hot showers or baths and direct sunlight. This often makes the itching worse.    An ice pack will reduce local areas of redness and itching. Make your own ice pack by putting ice cubes in a zip-top plastic bag and wrapping it in a thin towel. Over-the-counter creams containing  benzocaine may help with itching.    You can use an antihistamine with diphenhydramine if your healthcare provider didn't give you another antihistamine. This medicine may be used to reduce itching if large areas of the skin are involved. It's available at drugstores and grocery stores. Use caution because this medicine may cause drowsiness. If symptoms continue, talk with your healthcare provider or pharmacist about other over-the counter or prescription medicines that may be helpful.    Your healthcare provider may prescribe antibiotics to reduce your risk of getting Lyme disease. It's very important that you take them exactly as directed until they are completely finished.  Follow-up care  Follow up with your healthcare provider, or as advised.  Call 911  Call 911 if any of these occur:    Irregular or rapid heartbeat    Numbness, tingling, or weakness in the arms or legs  When to seek medical advice  Call your healthcare provider right away if any of these occur:  Signs of local infection. Watch for these during the next few days:    Increasing redness around the bite site    Increased pain or swelling    Fever over 100.4 F (38 C), or as directed by your healthcare provider    Fluid draining from the bite area  Signs of tick-related disease. Watch for these over the next few weeks to months:    Circular, red, ring-like rash appears at the bite area within 1 to 3 weeks    Tiredness, fever or chills, nausea or vomiting    Neck pain or stiffness, headache, or confusion    Muscle or bone aches    Joint pain or swelling, especially in the knee    Weakness on one side of the face    A spotted rash that starts on or includes the palms of the hands and soles of the feet  StayWell last reviewed this educational content on 12/1/2019 2000-2021 [employer]. All rights reserved. This information is not intended as a substitute for professional medical care. Always follow your healthcare professional's  instructions.

## 2021-09-05 ENCOUNTER — HEALTH MAINTENANCE LETTER (OUTPATIENT)
Age: 66
End: 2021-09-05

## 2022-02-16 ENCOUNTER — TELEPHONE (OUTPATIENT)
Dept: INTERNAL MEDICINE | Facility: CLINIC | Age: 67
End: 2022-02-16
Payer: COMMERCIAL

## 2022-02-16 NOTE — TELEPHONE ENCOUNTER
Patient Quality Outreach    Patient is due for the following:   Colon Cancer Screening -  Colonoscopy  Breast Cancer Screening - Mammogram    NEXT STEPS:   Schedule a office visit for Colonoscopy and mammogram     Type of outreach:    Sent [employer] message.    Next Steps:  Reach out within 90 days via Letter.    Max number of attempts reached: No. Will try again in 90 days if patient still on fail list.    Questions for provider review:    None     Abida Fang [facility]  Chart routed to Care Team.

## 2022-02-16 NOTE — LETTER
March 2, 2022      Lucia RIZVI Eng  [address]  [facility] [address]      Your healthcare team cares about your health. To provide you with the best care,   we have reviewed your chart and based on our findings, we see that you are due to:     - BREAST CANCER SCREENING:  Schedule Annual Mammogram. Breast [place] [facility] scheduling number - [phone] or schedule in MyChart (self referall)  - COLON CANCER SCREENING:  Call or mychart the clinic to schedule your colonoscopy or schedule/ your FIT Test, or Cologuard test    If you have already completed these items, please contact the clinic via phone or   [facility] so your care team can review and update your records. Thank you for   choosing [facility]ics for your healthcare needs. For any questions,   concerns, or to schedule an appointment please contact the clinic.       Healthy Regards,      Your [facility] Care Team

## 2022-03-02 NOTE — TELEPHONE ENCOUNTER
Patient Quality Outreach    Patient is due for the following:   Colon Cancer Screening -  Colonoscopy  Breast Cancer Screening - Mammogram    NEXT STEPS:   Schedule a office visit for mammogram and colonoscopy     Type of outreach:    Sent letter.    Next Steps:  Reach out within 90 days via Letter.    Max number of attempts reached: Yes. Will try again in 90 days if patient still on fail list.    Questions for provider review:    None     Abida Fang, [facility]  Chart routed to closing chart .

## 2022-03-18 ENCOUNTER — OFFICE VISIT (OUTPATIENT)
Dept: INTERNAL MEDICINE | Facility: CLINIC | Age: 67
End: 2022-03-18
Payer: COMMERCIAL

## 2022-03-18 VITALS
OXYGEN SATURATION: 98 % | HEIGHT: 63 IN | BODY MASS INDEX: 23.74 KG/M2 | DIASTOLIC BLOOD PRESSURE: 82 MMHG | TEMPERATURE: 97.5 F | SYSTOLIC BLOOD PRESSURE: 152 MMHG | WEIGHT: 134 LBS | HEART RATE: 62 BPM

## 2022-03-18 DIAGNOSIS — R73.09 ELEVATED GLUCOSE: ICD-10-CM

## 2022-03-18 DIAGNOSIS — R41.89 IMPAIRED COGNITION: ICD-10-CM

## 2022-03-18 DIAGNOSIS — R79.89 LOW VITAMIN D LEVEL: ICD-10-CM

## 2022-03-18 DIAGNOSIS — F41.1 GENERALIZED ANXIETY DISORDER: ICD-10-CM

## 2022-03-18 DIAGNOSIS — E78.5 HYPERLIPIDEMIA LDL GOAL <160: ICD-10-CM

## 2022-03-18 DIAGNOSIS — Z12.11 SCREEN FOR COLON CANCER: ICD-10-CM

## 2022-03-18 DIAGNOSIS — Z12.31 ENCOUNTER FOR SCREENING MAMMOGRAM FOR BREAST CANCER: ICD-10-CM

## 2022-03-18 DIAGNOSIS — Z00.01 ENCOUNTER FOR GENERAL ADULT MEDICAL EXAMINATION WITH ABNORMAL FINDINGS: Primary | ICD-10-CM

## 2022-03-18 DIAGNOSIS — S06.9X0S TRAUMATIC BRAIN INJURY, WITHOUT LOSS OF CONSCIOUSNESS, SEQUELA (H): ICD-10-CM

## 2022-03-18 LAB
ANION GAP SERPL CALCULATED.3IONS-SCNC: 6 MMOL/L (ref 3–14)
BUN SERPL-MCNC: 18 MG/DL (ref 7–30)
CALCIUM SERPL-MCNC: 8.9 MG/DL (ref 8.5–10.1)
CHLORIDE BLD-SCNC: 107 MMOL/L (ref 94–109)
CHOLEST SERPL-MCNC: 240 MG/DL
CO2 SERPL-SCNC: 26 MMOL/L (ref 20–32)
CREAT SERPL-MCNC: 0.66 MG/DL (ref 0.52–1.04)
FASTING STATUS PATIENT QL REPORTED: ABNORMAL
GFR SERPL CREATININE-BSD FRML MDRD: >90 ML/MIN/1.73M2
GLUCOSE BLD-MCNC: 91 MG/DL (ref 70–99)
HDLC SERPL-MCNC: 113 MG/DL
HOLD SPECIMEN: NORMAL
LDLC SERPL CALC-MCNC: 115 MG/DL
NONHDLC SERPL-MCNC: 127 MG/DL
POTASSIUM BLD-SCNC: 3.7 MMOL/L (ref 3.4–5.3)
SODIUM SERPL-SCNC: 139 MMOL/L (ref 133–144)
TRIGL SERPL-MCNC: 59 MG/DL
TSH SERPL DL<=0.005 MIU/L-ACNC: 2.84 MU/L (ref 0.4–4)

## 2022-03-18 PROCEDURE — 99396 PREV VISIT EST AGE 40-64: CPT | Performed by: INTERNAL MEDICINE

## 2022-03-18 PROCEDURE — 80048 BASIC METABOLIC PNL TOTAL CA: CPT | Performed by: INTERNAL MEDICINE

## 2022-03-18 PROCEDURE — 36415 COLL VENOUS BLD VENIPUNCTURE: CPT | Performed by: INTERNAL MEDICINE

## 2022-03-18 PROCEDURE — 84443 ASSAY THYROID STIM HORMONE: CPT | Performed by: INTERNAL MEDICINE

## 2022-03-18 PROCEDURE — 82306 VITAMIN D 25 HYDROXY: CPT | Performed by: INTERNAL MEDICINE

## 2022-03-18 PROCEDURE — 80061 LIPID PANEL: CPT | Performed by: INTERNAL MEDICINE

## 2022-03-18 RX ORDER — SERTRALINE HYDROCHLORIDE 100 MG/1
100 TABLET, FILM COATED ORAL DAILY
COMMUNITY
End: 2023-04-06

## 2022-03-18 NOTE — PATIENT INSTRUCTIONS
Get your Covid Booster (Moderna, 3rd of 3) at [facility].      Let me know if/when you are ready colonoscopy     Call to schedule imaging  :  Mammogram is due

## 2022-03-18 NOTE — PROGRESS NOTES
SUBJECTIVE:   CC: Lucia Hector is an 66 year old woman who presents for preventive health visit.     65 y/o F here for AFE.  H/o hyperlipidemia, Low Vit D, hyperlipidemia, VELASQUEZ, traumatic brain injury, VELASQUEZ.     Might get her last COVID shot via [facility].             Patient has been advised of split billing requirements and indicates understanding: Yes  Healthy Habits:    Getting at least 3 servings of Calcium per day:  Yes    Bi-annual eye exam:  NO    Dental care twice a year:  Yes    Sleep apnea or symptoms of sleep apnea:  None    Diet:  Regular (no restrictions)    Frequency of exercise:  6-7 days/week    Duration of exercise:  45-60 minutes    Taking medications regularly:  Yes    Barriers to taking medications:  None    Medication side effects:  None    PHQ-2 Total Score:    Additional concerns today:  No          No concerns     Today's PHQ-2 Score:   PHQ-2 ( 1999 Pfizer) 1/25/2021   Q1: Little interest or pleasure in doing things 0   Q2: Feeling down, depressed or hopeless 0   PHQ-2 Score 0   PHQ-2 Total Score (12-17 Years)- Positive if 3 or more points; Administer PHQ-A if positive 0   Q1: Little interest or pleasure in doing things Not at all   Q2: Feeling down, depressed or hopeless Not at all   PHQ-2 Score 0       Abuse: Current or Past (Physical, Sexual or Emotional) - No  Do you feel safe in your environment? Yes        Social History     Tobacco Use     Smoking status: Never Smoker     Smokeless tobacco: Never Used   Substance Use Topics     Alcohol use: No     If you drink alcohol do you typically have >3 drinks per day or >7 drinks per week? No    Alcohol Use 1/25/2021   Prescreen: >3 drinks/day or >7 drinks/week? Not Applicable   Prescreen: >3 drinks/day or >7 drinks/week? -       Reviewed orders with patient.  Reviewed health maintenance and updated orders accordingly - Yes  Lab work is in process  Labs reviewed in EPIC  BP Readings from Last 3 Encounters:   03/18/22 (!) 152/82   07/23/21  121/76   01/25/21 138/82    Wt Readings from Last 3 Encounters:   03/18/22 60.8 kg (134 lb)   07/23/21 63.3 kg (139 lb 9.6 oz)   01/25/21 68 kg (150 lb)                  Patient Active Problem List   Diagnosis     BASAL CELL CARCINOMA     Fibroadenosis of breast     Generalized anxiety disorder     Advanced directives, counseling/discussion     Hyperlipidemia LDL goal <160     QT prolongation     Impaired cognition     Traumatic brain injury (H)     Past Surgical History:   Procedure Laterality Date     FRACTURE TX, WRIST RT/LT  2010    ORIF   rt     HC REMOVAL OF OVARIAN CYST(S)  1983    laparotomy, 20 wks pregnant, ovarian cyst       Social History     Tobacco Use     Smoking status: Never Smoker     Smokeless tobacco: Never Used   Substance Use Topics     Alcohol use: No     Family History   Problem Relation Age of Onset     Diabetes Father      Heart Disease Mother      Respiratory Sister          Current Outpatient Medications   Medication Sig Dispense Refill     Magnesium Oxide 250 MG TABS        sertraline (ZOLOFT) 100 MG tablet Take 100 mg by mouth daily 2 daily       Vitamin D, Cholecalciferol, 1000 units CAPS        vitamin B complex with vitamin C (VITAMIN  B COMPLEX) tablet Take 1 tablet by mouth daily (Patient not taking: Reported on 3/18/2022)       No Known Allergies  Recent Labs   Lab Test 01/25/21  1134 01/23/20  1123 01/21/19  0841 01/08/18  1003 12/28/16  0738   A1C  --  5.4  --   --   --    LDL  --  183* 121* 136* 113*   HDL  --  102 100 105 96   TRIG  --  77 54 65 36   ALT  --   --   --   --  26   CR 0.69 0.64 0.72 0.67 0.77   GFRESTIMATED >90 >90 90 90 77   GFRESTBLACK >90 >90 >90 >90 >90  African American GFR Calc     POTASSIUM 3.8 4.1 4.0 3.8 4.1   TSH  --  2.64  --  2.42 2.48        Breast Cancer Screening:  Any new diagnosis of family breast, ovarian, or bowel cancer? No    FHS-7: No flowsheet data found.  click delete button to remove this line now  Mammogram Screening: Recommended  mammography every 1-2 years with patient discussion and risk factor consideration  Pertinent mammograms are reviewed under the imaging tab.    History of abnormal Pap smear:   Last 3 Pap Results:   PAP (no units)   Date Value   01/23/2020 NIL   11/11/2014 NIL   09/07/2011 NIL     PAP / HPV Latest Ref Rng & Units 1/23/2020 11/11/2014 9/7/2011   PAP (Historical) - NIL NIL NIL   HPV16 NEG:Negative Negative - -   HPV18 NEG:Negative Negative - -   HRHPV NEG:Negative Negative - -     Reviewed and updated as needed this visit by clinical staff                  Reviewed and updated as needed this visit by Provider                 Past Medical History:   Diagnosis Date     Carcinoma in situ of skin of other and unspecified parts of face 1987    basal cell carcinoma     Other and unspecified ovarian cyst 1983      Past Surgical History:   Procedure Laterality Date     FRACTURE TX, WRIST RT/LT  2010    ORIF   rt     HC REMOVAL OF OVARIAN CYST(S)  1983    laparotomy, 20 wks pregnant, ovarian cyst       Review of Systems  CONSTITUTIONAL: NEGATIVE for fever, chills, change in weight  INTEGUMENTARY/SKIN: NEGATIVE for worrisome rashes, moles or lesions  EYES: NEGATIVE for vision changes or irritation  ENT: NEGATIVE for ear, mouth and throat problems  RESP: NEGATIVE for significant cough or SOB  BREAST: NEGATIVE for masses, tenderness or discharge  CV: NEGATIVE for chest pain, palpitations or peripheral edema  GI: NEGATIVE for nausea, abdominal pain, heartburn, or change in bowel habits  : NEGATIVE for unusual urinary or vaginal symptoms. No vaginal bleeding.  MUSCULOSKELETAL: NEGATIVE for significant arthralgias or myalgia  NEURO: NEGATIVE for weakness, dizziness or paresthesias  PSYCHIATRIC: NEGATIVE for changes in mood or affect      OBJECTIVE:   LMP 07/12/2008   Physical Exam  GENERAL APPEARANCE: healthy, alert and no distress  EYES: Eyes grossly normal to inspection, PERRL and conjunctivae and sclerae normal  HENT: ear canals  and TM's normal, nose and mouth without ulcers or lesions, oropharynx clear and oral mucous membranes moist  NECK: no adenopathy, no asymmetry, masses, or scars and thyroid normal to palpation  RESP: lungs clear to auscultation - no rales, rhonchi or wheezes  BREAST: normal without masses, tenderness or nipple discharge and no palpable axillary masses or adenopathy  CV: regular rate and rhythm, normal S1 S2, no S3 or S4, no murmur, click or rub, no peripheral edema and peripheral pulses strong  ABDOMEN: soft, nontender, no hepatosplenomegaly, no masses and bowel sounds normal  Vertical surgical scar   (female): normal female external genitalia, normal urethral meatus, vaginal mucosal atrophy noted,   without masses or abnormal discharge   (female):bimanual only  MS: no musculoskeletal defects are noted and gait is age appropriate without ataxia  SKIN: no suspicious lesions or rashes  NEURO: Normal strength and tone, sensory exam grossly normal, mentation intact and speech normal  PSYCH: mentation appears normal and affect normal/bright    Diagnostic Test Results:  Labs reviewed in Epic  See orders.      ASSESSMENT/PLAN:   (Z00.01) Encounter for general adult medical examination with abnormal findings  (primary encounter diagnosis)  Comment: no concerns.   Plan:      (R79.89) Low vitamin D level  Comment: recheck   Plan: Vitamin D Deficiency, Basic metabolic panel             (R73.09) Elevated glucose  Comment:  Recheck TFTS  Plan: TSH with free T4 reflex, Basic metabolic panel             (S06.9X0S) Traumatic brain injury, without loss of consciousness, sequela (H)  Comment:  H/o    Paperwork filled out   Plan:      (R41.89) Impaired cognition  Comment: paperwork filled out.   Impaired cognition not evident at routine visit today   Plan:      (F41.1) Generalized anxiety disorder  Comment: doing well   Plan:      (E78.5) Hyperlipidemia LDL goal <160  Comment: *she might not want statin, prefers dietary...   Plan:  "Lipid panel reflex to direct LDL Fasting, TSH         with free T4 reflex, Basic metabolic panel            (Z12.11) Screen for colon cancer  Comment:  Due.  She will consider colonoscopy   Plan: Fecal colorectal cancer screen FIT - Future         (S+30)             (Z12.31) Encounter for screening mammogram for breast cancer  Comment: *due   Plan: MA SCREENING DIGITAL BILAT - Future  (s+30)                   COUNSELING:  Reviewed preventive health counseling, as reflected in patient instructions       due for COVID 3/3 booster, she will do at     Estimated body mass index is 24.15 kg/m  as calculated from the following:    Height as of 1/25/21: 1.619 m (5' 3.75\").    Weight as of 7/23/21: 63.3 kg (139 lb 9.6 oz).        She reports that she has never smoked. She has never used smokeless tobacco.      Counseling Resources:  ATP IV Guidelines  Pooled Cohorts Equation Calculator  Breast Cancer Risk Calculator  BRCA-Related Cancer Risk Assessment: FHS-7 Tool  FRAX Risk Assessment  ICSI Preventive Guidelines  Dietary Guidelines for Americans, 2010  USDA's MyPlate  ASA Prophylaxis  Lung CA Screening    Lala Lamar MD  [facility]  "

## 2022-03-21 LAB — DEPRECATED CALCIDIOL+CALCIFEROL SERPL-MC: 25 UG/L (ref 20–75)

## 2022-06-12 ENCOUNTER — HEALTH MAINTENANCE LETTER (OUTPATIENT)
Age: 67
End: 2022-06-12

## 2022-07-20 ENCOUNTER — TELEPHONE (OUTPATIENT)
Dept: INTERNAL MEDICINE | Facility: CLINIC | Age: 67
End: 2022-07-20

## 2022-07-20 NOTE — TELEPHONE ENCOUNTER
Patient Quality Outreach    Patient is due for the following:   Colon Cancer Screening -  Colonoscopy  Breast Cancer Screening - Mammogram    NEXT STEPS:   Mammogram and colonoscopy    Type of outreach:    Sent letter.    Next Steps:  Reach out within 90 days via Flapshare.    Max number of attempts reached: No. Will try again in 90 days if patient still on fail list.    Questions for provider review:    None     Abida Fang [facility]  Chart routed to Care Team.

## 2022-07-20 NOTE — LETTER
August 3, 2022      Lucia RIZVI Eng  [address]  [place] [facility] [place] [facility] [place] [address]      Your healthcare team cares about your health. To provide you with the best care,   we have reviewed your chart and based on our findings, we see that you are due to:     - BREAST CANCER SCREENING:  Schedule Annual Mammogram. Breast [place] scheduling number - [phone] or schedule in MyChart (self referall)  - COLON CANCER SCREENING:  Call or mychart the clinic to schedule your colonoscopy or schedule/ your FIT Test, or Cologuard test    If you have already completed these items, please contact the clinic via phone or   Auctionatahart so your care team can review and update your records. Thank you for   choosing [facility] for your healthcare needs. For any questions,   concerns, or to schedule an appointment please contact the clinic.       Healthy Regards,      Your [facility] Care Team

## 2022-08-03 NOTE — TELEPHONE ENCOUNTER
Patient Quality Outreach    Patient is due for the following:   Colon Cancer Screening  Breast Cancer Screening - Mammogram    Next Steps:   Schedule a office visit for mammogram and colonoscopy    Type of outreach:    Sent letter.    Next Steps:  Reach out within 90 days via Letter.    Max number of attempts reached: Yes. Will try again in 90 days if patient still on fail list.    Questions for provider review:    None     Abida Fang, [facility]  Chart routed to closing chart .

## 2022-08-30 ENCOUNTER — HOSPITAL ENCOUNTER (EMERGENCY)
Facility: CLINIC | Age: 67
Discharge: HOME | End: 2022-08-30
Attending: FAMILY MEDICINE | Admitting: FAMILY MEDICINE
Payer: COMMERCIAL

## 2022-08-30 VITALS
DIASTOLIC BLOOD PRESSURE: 76 MMHG | RESPIRATION RATE: 18 BRPM | OXYGEN SATURATION: 98 % | TEMPERATURE: 98.6 F | SYSTOLIC BLOOD PRESSURE: 134 MMHG | WEIGHT: 135 LBS | HEART RATE: 65 BPM | BODY MASS INDEX: 23.73 KG/M2

## 2022-08-30 DIAGNOSIS — S41.111A ARM LACERATION, RIGHT, INITIAL ENCOUNTER: ICD-10-CM

## 2022-08-30 PROCEDURE — 99282 EMERGENCY DEPT VISIT SF MDM: CPT | Mod: 25 | Performed by: FAMILY MEDICINE

## 2022-08-30 PROCEDURE — 12002 RPR S/N/AX/GEN/TRNK2.6-7.5CM: CPT | Performed by: FAMILY MEDICINE

## 2022-08-30 PROCEDURE — 250N000009 HC RX 250: Performed by: FAMILY MEDICINE

## 2022-08-30 PROCEDURE — 99283 EMERGENCY DEPT VISIT LOW MDM: CPT | Performed by: FAMILY MEDICINE

## 2022-08-30 RX ORDER — LIDOCAINE HYDROCHLORIDE AND EPINEPHRINE 10; 10 MG/ML; UG/ML
1 INJECTION, SOLUTION INFILTRATION; PERINEURAL ONCE
Status: COMPLETED | OUTPATIENT
Start: 2022-08-30 | End: 2022-08-30

## 2022-08-30 RX ADMIN — LIDOCAINE HYDROCHLORIDE,EPINEPHRINE BITARTRATE 1 ML: 10; .01 INJECTION, SOLUTION INFILTRATION; PERINEURAL at 11:40

## 2022-08-30 ASSESSMENT — ACTIVITIES OF DAILY LIVING (ADL): ADLS_ACUITY_SCORE: 33

## 2022-08-30 NOTE — ED PROVIDER NOTES
History     Chief Complaint   Patient presents with     Fall     Laceration     HPI  Lucia Hector is a 66 year old female who presents with a laceration to her right forearm.  Patient missed the step this morning and fell and hit the ground.  Patient states she also hit her side of her face a little bit on the ground.  She is having some mild soreness there.  Denies any loss of consciousness.  Patient can open and close her jaw without any problems.  Patient states the bleeding is controlled with pressure.  She thinks she last had her tetanus a couple years ago.  Denies any extremity numbness or tingling and has normal range of motion of her hands.    Allergies:  No Known Allergies    Problem List:    Patient Active Problem List    Diagnosis Date Noted     QT prolongation 09/17/2012     Priority: Medium     Hyperlipidemia LDL goal <160 12/14/2011     Priority: Medium     Advanced directives, counseling/discussion 12/13/2011     Priority: Medium     Advance Directive Problem List Overview:   Name Relationship Phone    Primary Health Care Agent            Alternative Health Care Agent          Discussed advance care planning with patient; information given to patient to review. 12/13/2011        Jose Colón MD  08/30/22 1128         Generalized anxiety disorder 09/13/2011     Priority: Medium     Diagnosis updated by automated process. Provider to review and confirm.       Impaired cognition 10/07/2010     Priority: Medium     Traumatic brain injury (H) 10/07/2010     Priority: Medium     Fibroadenosis of breast 10/14/2007     Priority: Medium     Needs yearly mammograms.         BASAL CELL CARCINOMA 08/23/2007     Priority: Medium     Face, removed with Mohs at 33 yo  Problem list name updated by automated process. Provider to review and confirm          Past Medical History:    Past Medical History:   Diagnosis Date     Carcinoma in situ of skin of other and unspecified parts of face 1987     Other  and unspecified ovarian cyst 1983       Past Surgical History:    Past Surgical History:   Procedure Laterality Date     FRACTURE TX, WRIST RT/LT  2010    ORIF   rt     HC REMOVAL OF OVARIAN CYST(S)  1983    laparotomy, 20 wks pregnant, ovarian cyst       Family History:    Family History   Problem Relation Age of Onset     Diabetes Father      Heart Disease Mother      Respiratory Sister        Social History:  Marital Status:  Single [1]  Social History     Tobacco Use     Smoking status: Never Smoker     Smokeless tobacco: Never Used   Vaping Use     Vaping Use: Never used   Substance Use Topics     Alcohol use: No     Drug use: No        Medications:    Magnesium Oxide 250 MG TABS  sertraline (ZOLOFT) 100 MG tablet  vitamin B complex with vitamin C (VITAMIN  B COMPLEX) tablet  Vitamin D, Cholecalciferol, 1000 units CAPS          Review of Systems   All other systems reviewed and are negative.      Physical Exam   BP: (!) 149/87  Pulse: 63  Temp: 98.6  F (37  C)  Resp: 18  Weight: 61.2 kg (135 lb)  SpO2: 98 %      Physical Exam  Vitals and nursing note reviewed.   Constitutional:       General: She is not in acute distress.     Appearance: Normal appearance. She is not ill-appearing.   Musculoskeletal:      Right forearm: Laceration and tenderness present. No swelling, edema, deformity or bony tenderness.        Arms:    Neurological:      Mental Status: She is alert.         ED [facility]    -Laceration Repair    Date/Time: 8/30/2022 11:28 AM  Performed by: Jose Colón MD  Authorized by: Jose Colón MD     Risks, benefits and alternatives discussed.      ANESTHESIA (see MAR for exact dosages):     Anesthesia method:  Local infiltration    Local anesthetic:  Lidocaine 1% WITH epi  LACERATION DETAILS     Location:  Shoulder/arm    Shoulder/arm location:  R upper arm    Length (cm):  6    REPAIR TYPE:     Repair type:   Simple      EXPLORATION:     Wound exploration: wound explored through full range of motion and entire depth of wound probed and visualized      TREATMENT:     Area cleansed with:  Saline    Amount of cleaning:  Standard    Irrigation solution:  Sterile water    SKIN REPAIR     Repair method:  Sutures    Suture size:  4-0    Suture material:  Nylon    Suture technique:  Running    Number of sutures:  8    APPROXIMATION     Approximation:  Loose    POST-PROCEDURE DETAILS     Dressing:  Sterile dressing        PROCEDURE    Patient Tolerance:  Patient tolerated the procedure well with no immediate complications        No results found for this or any previous visit (from the past 24 hour(s)).    Medications   lidocaine 1% with EPINEPHrine 1:100,000 injection 1 mL (has no administration in time range)     Laceration was repaired as noted above.  Tetanus is up-to-date.  Sutures need to be removed in 7 to 10 days.    Assessments & Plan (with Medical Decision Making)  Right arm laceration     I have reviewed the nursing notes.    I have reviewed the findings, diagnosis, plan and need for follow up with the patient.              8/30/2022   [facility] EMERGENCY DEPT

## 2022-09-06 ENCOUNTER — TELEPHONE (OUTPATIENT)
Dept: INTERNAL MEDICINE | Facility: CLINIC | Age: 67
End: 2022-09-06

## 2022-09-06 NOTE — TELEPHONE ENCOUNTER
Patient Quality Outreach    Patient is due for the following:   Colon Cancer Screening  Breast Cancer Screening - Mammogram    Next Steps:   Schedule a office visit for mammogram and colonoscopy    Type of outreach:    Sent InnoPad message.    Next Steps:  Reach out within 90 days via Letter.    Max number of attempts reached: No. Will try again in 90 days if patient still on fail list.    Questions for provider review:    None     Abida Fang [facility]  Chart routed to Care Team.

## 2022-09-06 NOTE — LETTER
September 28, 2022      Lucia RIZVI Eng  [address]  [place] [facility] [address]      Your healthcare team cares about your health. To provide you with the best care,   we have reviewed your chart and based on our findings, we see that you are due to:     - BREAST CANCER SCREENING:  Schedule Annual Mammogram. Breast [place] scheduling number - [phone] or schedule in MyChart (self referall)  - COLON CANCER SCREENING:  Call or mychart the clinic to schedule your colonoscopy or schedule/ your FIT Test, or Cologuard test    If you have already completed these items, please contact the clinic via phone or   Cnekthart so your care team can review and update your records. Thank you for   choosing [facility] for your healthcare needs. For any questions,   concerns, or to schedule an appointment please contact the clinic.       Healthy Regards,      Your [facility] Care Team

## 2022-09-26 NOTE — ED TRIAGE NOTES
Patient fell down the stairs (missing last step) this AM around 0400, laceration to R forearm area. No LOC.      Triage Assessment     Row Name 08/30/22 1100       Triage Assessment (Adult)    Airway WDL WDL       Respiratory WDL    Respiratory WDL WDL       Cardiac WDL    Cardiac WDL WDL               No

## 2022-09-28 NOTE — TELEPHONE ENCOUNTER
Patient Quality Outreach    Patient is due for the following:   Colon Cancer Screening  Breast Cancer Screening - Mammogram    Next Steps:   Due for a mammogram and colonoscopy     Type of outreach:    Sent letter.    Next Steps:  Reach out within 90 days via Letter.    Max number of attempts reached: Yes. Will try again in 90 days if patient still on fail list.    Questions for provider review:    None     Abida Fang, [facility]  Chart routed to closing chart .

## 2022-10-17 ENCOUNTER — TELEPHONE (OUTPATIENT)
Dept: FAMILY MEDICINE | Facility: CLINIC | Age: 67
End: 2022-10-17

## 2022-10-17 DIAGNOSIS — Z12.11 SCREENING FOR COLON CANCER: Primary | ICD-10-CM

## 2022-10-17 NOTE — TELEPHONE ENCOUNTER
Patient called received a letter in the mail she would like to receive a Cologuard please call her at [phone] and if do not reach on phone send her a FanBoomt message has questions.  Margarita Cuellar   [facility]

## 2022-10-17 NOTE — TELEPHONE ENCOUNTER
Patient would like to do Cologuard test. Routing to provider to order. Does this get mailed to patient?

## 2022-10-22 ENCOUNTER — HEALTH MAINTENANCE LETTER (OUTPATIENT)
Age: 67
End: 2022-10-22

## 2022-10-31 ENCOUNTER — ANCILLARY PROCEDURE (OUTPATIENT)
Dept: MAMMOGRAPHY | Facility: CLINIC | Age: 67
End: 2022-10-31
Attending: INTERNAL MEDICINE
Payer: COMMERCIAL

## 2022-10-31 DIAGNOSIS — Z12.31 ENCOUNTER FOR SCREENING MAMMOGRAM FOR BREAST CANCER: ICD-10-CM

## 2022-10-31 PROCEDURE — 77067 SCR MAMMO BI INCL CAD: CPT | Mod: TC | Performed by: STUDENT IN AN ORGANIZED HEALTH CARE EDUCATION/TRAINING PROGRAM

## 2023-02-18 ENCOUNTER — OFFICE VISIT (OUTPATIENT)
Dept: FAMILY MEDICINE | Facility: CLINIC | Age: 68
End: 2023-02-18
Payer: COMMERCIAL

## 2023-02-18 ENCOUNTER — HOSPITAL ENCOUNTER (OUTPATIENT)
Dept: GENERAL RADIOLOGY | Facility: HOSPITAL | Age: 68
Discharge: HOME | End: 2023-02-18
Payer: COMMERCIAL

## 2023-02-18 VITALS
WEIGHT: 134 LBS | HEART RATE: 71 BPM | HEIGHT: 65 IN | OXYGEN SATURATION: 97 % | BODY MASS INDEX: 22.33 KG/M2 | SYSTOLIC BLOOD PRESSURE: 137 MMHG | TEMPERATURE: 97.8 F | RESPIRATION RATE: 16 BRPM | DIASTOLIC BLOOD PRESSURE: 80 MMHG

## 2023-02-18 DIAGNOSIS — R05.1 ACUTE COUGH: Primary | ICD-10-CM

## 2023-02-18 PROCEDURE — 99213 OFFICE O/P EST LOW 20 MIN: CPT

## 2023-02-18 PROCEDURE — 71046 X-RAY EXAM CHEST 2 VIEWS: CPT

## 2023-02-18 RX ORDER — BENZONATATE 200 MG/1
200 CAPSULE ORAL 3 TIMES DAILY PRN
Qty: 21 CAPSULE | Refills: 0 | Status: SHIPPED | OUTPATIENT
Start: 2023-02-18 | End: 2023-05-12

## 2023-02-18 RX ORDER — GUAIFENESIN AND DEXTROMETHORPHAN HYDROBROMIDE 100; 10 MG/5ML; MG/5ML
5 SOLUTION ORAL EVERY 4 HOURS PRN
COMMUNITY
End: 2023-05-12

## 2023-02-18 NOTE — PATIENT INSTRUCTIONS
Chest x-ray does not show pneumonia per the radiologist report.  Take Tessalon as prescribed.  Get plenty of rest and drink fluids.  Can use Tylenol and/or ibuprofen as needed for pain.  Maximum dose of Tylenol is 4000mg in a 24 hour period of time.  Take ibuprofen with food to avoid stomach upset.

## 2023-02-18 NOTE — PROGRESS NOTES
"ASSESSMENT:  (R05.1) Acute cough  (primary encounter diagnosis)  Plan: XR Chest 2 Views, benzonatate (TESSALON) 200 MG        capsule    PLAN:  Informed the patient that the chest x-ray does not show pneumonia per the radiologist report.  Instructed her to take Tessalon as prescribed, get plenty of rest, drink fluids and use Tylenol and/or ibuprofen as needed for pain with the maximum dose Tylenol being 4000 mg in a 24-hour period of time and to take ibuprofen with food to avoid upset stomach.  We also discussed the need to return to clinic with any new or worsening symptoms.  Patient acknowledged her understanding of the above plan.    The use of Dragon/Siteheartation services may have been used to construct the content in this note; any grammatical or spelling errors are non-intentional. Please contact the author of this note directly if you are in need of any clarification.      Rigo Vines, DESTINEY CNP      SUBJECTIVE:  Lucia Hector is a 67 year old female who presents to the clinic today with a chief complaint of cough  for 5 day(s).  Her cough is described as dry.  The patient's symptoms are moderate to severe and not changing over the course of time.  Associated symptoms include rhinorrhea and fatigue. The patient's symptoms are exacerbated by no particular triggers  Patient has been using Robitussin DM and Ricola cough drops to improve symptoms.  The patient is concerned she has pneumonia.      The patient did not do an at home COVID test.     ROS  Review of systems negative except as stated above.    OBJECTIVE:  /80 (BP Location: Right arm, Patient Position: Sitting, Cuff Size: Adult Regular)   Pulse 71   Temp 97.8  F (36.6  C) (Oral)   Resp 16   Ht 1.638 m (5' 4.5\")   Wt 60.8 kg (134 lb)   LMP 07/12/2008   SpO2 97%   BMI 22.65 kg/m    GENERAL APPEARANCE: healthy, alert and no distress  EYES: EOMI,  PERRL, conjunctiva clear  HENT: ear canals and TM's normal.  Nose and mouth without " ulcers, erythema or lesions  NECK: supple, nontender, no lymphadenopathy  RESP: lungs clear to auscultation - no rales, rhonchi or wheezes  CV: regular rates and rhythm, normal S1 S2, no murmur noted  SKIN: no suspicious lesions or rashes    X-RAY: normal chest X-ray per radiologist report.

## 2023-03-15 ENCOUNTER — TELEPHONE (OUTPATIENT)
Dept: INTERNAL MEDICINE | Facility: CLINIC | Age: 68
End: 2023-03-15
Payer: COMMERCIAL

## 2023-03-15 NOTE — TELEPHONE ENCOUNTER
Patient Quality Outreach    Patient is due for the following:   Colon Cancer Screening  Physical Annual Wellness Visit    Next Steps:   Due for a colonoscopy and physical    Type of outreach:    Sent [employer] message.    Next Steps:  Reach out within 90 days via Letter.    Max number of attempts reached: No. Will try again in 90 days if patient still on fail list.    Questions for provider review:    None     Abida Fang CMA  Chart routed to Care Team.

## 2023-04-06 ENCOUNTER — TELEPHONE (OUTPATIENT)
Dept: INTERNAL MEDICINE | Facility: CLINIC | Age: 68
End: 2023-04-06
Payer: COMMERCIAL

## 2023-04-06 DIAGNOSIS — R94.31 QT PROLONGATION: ICD-10-CM

## 2023-04-06 DIAGNOSIS — F41.1 GENERALIZED ANXIETY DISORDER: ICD-10-CM

## 2023-04-06 DIAGNOSIS — F41.9 ANXIETY: ICD-10-CM

## 2023-04-06 DIAGNOSIS — Z51.81 ENCOUNTER FOR THERAPEUTIC DRUG MONITORING: Primary | ICD-10-CM

## 2023-04-07 RX ORDER — SERTRALINE HYDROCHLORIDE 100 MG/1
200 TABLET, FILM COATED ORAL DAILY
Qty: 180 TABLET | Refills: 3 | Status: SHIPPED | OUTPATIENT
Start: 2023-04-07 | End: 2024-07-18

## 2023-04-10 NOTE — TELEPHONE ENCOUNTER
Called patient and left message to call back and schedule on ancillary         Neha JIMÉNEZ CMA ([facility])

## 2023-04-12 NOTE — TELEPHONE ENCOUNTER
2nd message left for patient to schedule EKG on the ancillary nurse schedule in about a week or so.  EKG ordered by Dr. Lamar.  Daphney Schuster,

## 2023-04-14 NOTE — TELEPHONE ENCOUNTER
Patient Quality Outreach    Patient is due for the following:   Colon Cancer Screening  Physical Annual Wellness Visit    Next Steps:   Patient has upcoming appointment, these items will be addressed at that time.    Type of outreach:    Chart review performed, no outreach needed.    Next Steps:  Reach out within 90 days via Letter.    Max number of attempts reached: Yes. Will try again in 90 days if patient still on fail list.    Questions for provider review:    None     Abida Fang [facility]  Chart routed to closing chart .

## 2023-05-12 ENCOUNTER — OFFICE VISIT (OUTPATIENT)
Dept: INTERNAL MEDICINE | Facility: CLINIC | Age: 68
End: 2023-05-12
Payer: COMMERCIAL

## 2023-05-12 VITALS
HEIGHT: 63 IN | OXYGEN SATURATION: 97 % | SYSTOLIC BLOOD PRESSURE: 146 MMHG | DIASTOLIC BLOOD PRESSURE: 82 MMHG | WEIGHT: 151.6 LBS | TEMPERATURE: 98 F | BODY MASS INDEX: 26.86 KG/M2 | HEART RATE: 55 BPM

## 2023-05-12 DIAGNOSIS — Z13.1 SCREENING FOR DIABETES MELLITUS: ICD-10-CM

## 2023-05-12 DIAGNOSIS — E78.5 HYPERLIPIDEMIA LDL GOAL <160: ICD-10-CM

## 2023-05-12 DIAGNOSIS — R94.31 QT PROLONGATION: ICD-10-CM

## 2023-05-12 DIAGNOSIS — Z12.11 SCREEN FOR COLON CANCER: ICD-10-CM

## 2023-05-12 DIAGNOSIS — Z00.01 ENCOUNTER FOR GENERAL ADULT MEDICAL EXAMINATION WITH ABNORMAL FINDINGS: Primary | ICD-10-CM

## 2023-05-12 DIAGNOSIS — F41.1 GENERALIZED ANXIETY DISORDER: ICD-10-CM

## 2023-05-12 DIAGNOSIS — Z51.81 ENCOUNTER FOR THERAPEUTIC DRUG MONITORING: ICD-10-CM

## 2023-05-12 LAB
ANION GAP SERPL CALCULATED.3IONS-SCNC: 12 MMOL/L (ref 7–15)
BUN SERPL-MCNC: 22.7 MG/DL (ref 8–23)
CALCIUM SERPL-MCNC: 9.5 MG/DL (ref 8.8–10.2)
CHLORIDE SERPL-SCNC: 103 MMOL/L (ref 98–107)
CHOLEST SERPL-MCNC: 246 MG/DL
CREAT SERPL-MCNC: 0.67 MG/DL (ref 0.51–0.95)
DEPRECATED HCO3 PLAS-SCNC: 23 MMOL/L (ref 22–29)
FASTING STATUS PATIENT QL REPORTED: NORMAL
GFR SERPL CREATININE-BSD FRML MDRD: >90 ML/MIN/1.73M2
GLUCOSE SERPL-MCNC: 96 MG/DL (ref 70–99)
GLUCOSE SERPL-MCNC: 96 MG/DL (ref 70–99)
HDLC SERPL-MCNC: 84 MG/DL
LDLC SERPL CALC-MCNC: 151 MG/DL
NONHDLC SERPL-MCNC: 162 MG/DL
POTASSIUM SERPL-SCNC: 3.9 MMOL/L (ref 3.4–5.3)
SODIUM SERPL-SCNC: 138 MMOL/L (ref 136–145)
TRIGL SERPL-MCNC: 57 MG/DL
TSH SERPL DL<=0.005 MIU/L-ACNC: 2.23 UIU/ML (ref 0.3–4.2)

## 2023-05-12 PROCEDURE — 80048 BASIC METABOLIC PNL TOTAL CA: CPT | Performed by: INTERNAL MEDICINE

## 2023-05-12 PROCEDURE — 80061 LIPID PANEL: CPT | Performed by: INTERNAL MEDICINE

## 2023-05-12 PROCEDURE — G0403 EKG FOR INITIAL PREVENT EXAM: HCPCS | Performed by: INTERNAL MEDICINE

## 2023-05-12 PROCEDURE — 36415 COLL VENOUS BLD VENIPUNCTURE: CPT | Performed by: INTERNAL MEDICINE

## 2023-05-12 PROCEDURE — G0402 INITIAL PREVENTIVE EXAM: HCPCS | Performed by: INTERNAL MEDICINE

## 2023-05-12 PROCEDURE — 84443 ASSAY THYROID STIM HORMONE: CPT | Performed by: INTERNAL MEDICINE

## 2023-05-12 ASSESSMENT — ENCOUNTER SYMPTOMS
BREAST MASS: 0
CHILLS: 0
MYALGIAS: 0
EYE PAIN: 0
WEAKNESS: 0
PALPITATIONS: 0
ABDOMINAL PAIN: 0
JOINT SWELLING: 0
CONSTIPATION: 0
FREQUENCY: 0
DIARRHEA: 0
HEARTBURN: 0
FEVER: 0
COUGH: 0
NAUSEA: 0
HEMATOCHEZIA: 0
DIZZINESS: 0
HEMATURIA: 0
ARTHRALGIAS: 0
NERVOUS/ANXIOUS: 0
HEADACHES: 0
SORE THROAT: 0
DYSURIA: 0
SHORTNESS OF BREATH: 0
PARESTHESIAS: 0

## 2023-05-12 ASSESSMENT — ACTIVITIES OF DAILY LIVING (ADL): CURRENT_FUNCTION: NO ASSISTANCE NEEDED

## 2023-05-12 NOTE — PROGRESS NOTES
"SUBJECTIVE:   Lucia is a 67 year old who presents for Preventive Visit.    66 y/o F here for AFE/wellness     Lives in [facility] in [place].  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      Her 93 y/o  mother is now living with her after hip fx...      H/o hyperlipidemia, Low Vit D, hyperlipidemia, VELASQUEZ, traumatic brain injury, VELASQUEZ.     Per psych provider, *4/7/23, will restart zoloft 200 mg    recheck EKG after 2 weeks (QT interval)  She would like to stay on this due to PTSD.      .                    5/12/2023     2:17 PM   Additional Questions   Roomed by Martha     Patient has been advised of split billing requirements and indicates understanding: Yes  Are you in the first 12 months of your Medicare coverage?  Yes,  Visual Acuity:  Right Eye: 20/32   Left Eye: 20/25  Both Eyes: 20/25    Healthy Habits:     In general, how would you rate your overall health?  Good    Frequency of exercise:  6-7 days/week    Duration of exercise:  45-60 minutes    Do you usually eat at least 4 servings of fruit and vegetables a day, include whole grains    & fiber and avoid regularly eating high fat or \"junk\" foods?  Yes    Taking medications regularly:  Yes    Medication side effects:  None    Ability to successfully perform activities of daily living:  No assistance needed    Home Safety:  No safety concerns identified    Hearing Impairment:  No hearing concerns    In the past 6 months, have you been bothered by leaking of urine?  No    In general, how would you rate your overall mental or emotional health?  Good      PHQ-2 Total Score: 0    Additional concerns today:  No      Have you ever done Advance Care Planning? (For example, a Health Directive, POLST, or a discussion with a medical provider or your loved ones about your wishes): No, advance care planning information given to patient to review.  Patient plans to discuss their wishes with loved ones or provider.         Fall risk  Fallen 2 or more times in the past year?: " No  Any fall with injury in the past year?: No    Cognitive Screening   1) Repeat 3 items (Leader, Season, Table)    2) Clock draw: NORMAL  3) 3 item recall: Recalls 3 objects  Results: 3 items recalled: COGNITIVE IMPAIRMENT LESS LIKELY    Mini-CogTM Copyright S Jay. Licensed by the author for use in [facility]; reprinted with permission (hayden@[email or web address]). All rights reserved.      Do you have sleep apnea, excessive snoring or daytime drowsiness?: no    Reviewed and updated as needed this visit by clinical staff     Meds              Reviewed and updated as needed this visit by Provider                 Social History     Tobacco Use     Smoking status: Former     Types: Cigarettes     Start date: 2/15/1978     Quit date: 1/15/1993     Years since quittin.3     Smokeless tobacco: Never   Vaping Use     Vaping status: Never Used   Substance Use Topics     Alcohol use: No             2023    12:38 PM   Alcohol Use   Prescreen: >3 drinks/day or >7 drinks/week? Not Applicable          View : No data to display.              Do you have a current opioid prescription? No  Do you use any other controlled substances or medications that are not prescribed by a provider? None                Current providers sharing in care for this patient include:    Patient Care Team:  Lala Lamar MD as PCP - General (Internal Medicine)  Lala Lamar MD as Assigned PCP    The following health maintenance items are reviewed in Epic and correct as of today:  Health Maintenance   Topic Date Due     DEXA  Never done     ZOSTER IMMUNIZATION (1 of 2) Never done     COLORECTAL CANCER SCREENING  2016     COVID-19 Vaccine (3 - Moderna series) 2021     INFLUENZA VACCINE (1) 2022     MEDICARE ANNUAL WELLNESS VISIT  2023     ANNUAL REVIEW OF HM ORDERS  2023     Pneumococcal Vaccine: 65+ Years (1 - PCV) 2022     FALL RISK ASSESSMENT  2024     MAMMO SCREENING  10/31/2024      PAP FOLLOW-UP  2025     HPV FOLLOW-UP  2025     ADVANCE CARE PLANNING  2026     LIPID  2027     DTAP/TDAP/TD IMMUNIZATION (3 - Td or Tdap) 2031     HEPATITIS C SCREENING  Completed     PHQ-2 (once per calendar year)  Completed     HIV SCREENING  Addressed     IPV IMMUNIZATION  Aged Out     MENINGITIS IMMUNIZATION  Aged Out     Lab work is in process  Labs reviewed in EPIC  BP Readings from Last 3 Encounters:   23 (!) 146/82   23 137/80   22 134/76    Wt Readings from Last 3 Encounters:   23 68.8 kg (151 lb 9.6 oz)   23 60.8 kg (134 lb)   22 61.2 kg (135 lb)                  Patient Active Problem List   Diagnosis     BASAL CELL CARCINOMA     Fibroadenosis of breast     Generalized anxiety disorder     Advanced directives, counseling/discussion     Hyperlipidemia LDL goal <160     QT prolongation     Impaired cognition     Traumatic brain injury (H)     Past Surgical History:   Procedure Laterality Date     ABDOMEN SURGERY      Cyst removed     BIOPSY      Fibrous tissue on mammogram. Benign.     FRACTURE TX, WRIST RT/LT  2010    ORIF   rt     HC REMOVAL OF OVARIAN CYST(S)  1983    laparotomy, 20 wks pregnant, ovarian cyst       Social History     Tobacco Use     Smoking status: Former     Types: Cigarettes     Start date: 2/15/1978     Quit date: 1/15/1993     Years since quittin.3     Smokeless tobacco: Never   Vaping Use     Vaping status: Never Used   Substance Use Topics     Alcohol use: No     Family History   Problem Relation Age of Onset     Diabetes Father      Heart Disease Mother      Respiratory Sister          Current Outpatient Medications   Medication Sig Dispense Refill     Magnesium Oxide 250 MG TABS        sertraline (ZOLOFT) 100 MG tablet Take 2 tablets (200 mg) by mouth daily 2 daily 180 tablet 3     vitamin B complex with vitamin C (STRESS TAB) tablet Take 1 tablet by mouth daily       Vitamin D,  Cholecalciferol, 1000 units CAPS        benzonatate (TESSALON) 200 MG capsule Take 1 capsule (200 mg) by mouth 3 times daily as needed for cough 21 capsule 0     Dextromethorphan-guaiFENesin  MG/5ML syrup Take 5 mLs by mouth every 4 hours as needed for cough       No Known Allergies  Recent Labs   Lab Test 03/18/22  1507 01/25/21  1134 01/23/20  1123 01/21/19  0841 01/08/18  1003 12/28/16  0738   A1C  --   --  5.4  --   --   --    *  --  183* 121*   < > 113*     --  102 100   < > 96   TRIG 59  --  77 54   < > 36   ALT  --   --   --   --   --  26   CR 0.66 0.69 0.64 0.72   < > 0.77   GFRESTIMATED >90 >90 >90 90   < > 77   GFRESTBLACK  --  >90 >90 >90   < > >90  African American GFR Calc     POTASSIUM 3.7 3.8 4.1 4.0   < > 4.1   TSH 2.84  --  2.64  --    < > 2.48    < > = values in this interval not displayed.               5/12/2023    12:39 PM   Breast CA Risk Assessment (FHS-7)   Do you have a family history of breast, colon, or ovarian cancer? No / Unknown         Mammogram Screening: Recommended mammography every 1-2 years with patient discussion and risk factor consideration  Pertinent mammograms are reviewed under the imaging tab.    Review of Systems   Constitutional: Negative for chills and fever.   HENT: Negative for congestion, ear pain, hearing loss and sore throat.    Eyes: Negative for pain and visual disturbance.   Respiratory: Negative for cough and shortness of breath.    Cardiovascular: Negative for chest pain, palpitations and peripheral edema.   Gastrointestinal: Negative for abdominal pain, constipation, diarrhea, heartburn, hematochezia and nausea.   Breasts:  Negative for tenderness, breast mass and discharge.   Genitourinary: Negative for dysuria, frequency, genital sores, hematuria, pelvic pain, urgency, vaginal bleeding and vaginal discharge.   Musculoskeletal: Negative for arthralgias, joint swelling and myalgias.   Skin: Negative for rash.   Neurological: Negative for  "dizziness, weakness, headaches and paresthesias.   Psychiatric/Behavioral: Negative for mood changes. The patient is not nervous/anxious.           OBJECTIVE:   BP (!) 146/82   Pulse 55   Temp 98  F (36.7  C) (Oral)   Ht 1.605 m (5' 3.19\")   Wt 68.8 kg (151 lb 9.6 oz)   LMP 07/12/2008   SpO2 97%   BMI 26.69 kg/m   Estimated body mass index is 26.69 kg/m  as calculated from the following:    Height as of this encounter: 1.605 m (5' 3.19\").    Weight as of this encounter: 68.8 kg (151 lb 9.6 oz).  Physical Exam  GENERAL: healthy, alert and no distress  EYES: Eyes grossly normal to inspection, PERRL and conjunctivae and sclerae normal  HENT: ear canals and TM's normal, nose and mouth without ulcers or lesions  NECK: no adenopathy, no asymmetry, masses, or scars and thyroid normal to palpation  RESP: lungs clear to auscultation - no rales, rhonchi or wheezes  BREAST: normal without masses, tenderness or nipple discharge and no palpable axillary masses or adenopathy  CV: regular rate and rhythm, normal S1 S2, no S3 or S4, no murmur, click or rub, no peripheral edema and peripheral pulses strong  ABDOMEN: soft, nontender, no hepatosplenomegaly, no masses and bowel sounds normal   (female): deferred  MS: no gross musculoskeletal defects noted, no edema  SKIN: no suspicious lesions or rashes  NEURO: Normal strength and tone, mentation intact and speech normal  PSYCH: mentation appears normal, affect normal/bright    Diagnostic Test Results:  Labs reviewed in Epic  No results found for this or any previous visit (from the past 24 hour(s)).    ASSESSMENT / PLAN:   (Z00.01) Encounter for general adult medical examination with abnormal findings  (primary encounter diagnosis)  Comment:    Plan:      (F41.1) Generalized anxiety disorder/PTSD   Comment:  Plans to continue current management zoloft  Plan:      (R94.31) QT prolongation  Comment: not with zoloft.  Had been an issue on citalopram   Plan: EKG 12-lead complete " "w/read - Clinics, Basic         metabolic panel             (Z51.81) Encounter for therapeutic drug monitoring  Comment:  No qt interval prolongagion   Plan: EKG 12-lead complete w/read - Clinics             (E78.5) Hyperlipidemia LDL goal <160  Comment:    Plan: TSH with free T4 reflex, Lipid panel reflex to         direct LDL Fasting             (Z12.11) Screen for colon cancer  Comment:    Plan: COLOGUARD([employer])             (Z13.1) Screening for diabetes mellitus  Comment:    Plan: Glucose                     COUNSELING:  Reviewed preventive health counseling, as reflected in patient instructions       Regular exercise      BMI:   Estimated body mass index is 26.69 kg/m  as calculated from the following:    Height as of this encounter: 1.605 m (5' 3.19\").    Weight as of this encounter: 68.8 kg (151 lb 9.6 oz).         She reports that she quit smoking about 30 years ago. Her smoking use included cigarettes. She started smoking about 45 years ago. She has never used smokeless tobacco.      Appropriate preventive services were discussed with this patient, including applicable screening as appropriate for cardiovascular disease, diabetes, osteopenia/osteoporosis, and glaucoma.  As appropriate for age/gender, discussed screening for colorectal cancer, prostate cancer, breast cancer, and cervical cancer. Checklist reviewing preventive services available has been given to the patient.    Reviewed patients plan of care and provided an AVS. The Basic Care Plan (routine screening as documented in Health Maintenance) for Lucia meets the Care Plan requirement. This Care Plan has been established and reviewed with the Patient.          Lala Lamar MD  [facility]    Identified Health Risks:    "

## 2023-05-12 NOTE — PATIENT INSTRUCTIONS
Rachel.    Order sent    Call to schedule imaging    :   DEXA (bone density)       You are eligible for Shingles vaccine series and Pneumonia shot  Work with your pharmacist.

## 2023-05-12 NOTE — LETTER
"May 15, 2023    Lucia Hector  [address]  [facility] [address]          Dear ,    We are writing to inform you of your test results.    Electrolytes and kidney function are normal.     Thyroid hormone level is normal.    Cholesterol is a little elevated.   Your \"10 year cardiovascular risk\" can be calculated using the cholesterol values and your demographics (See below).   Your risk is considered to be at 6.7%.     Medication is strongly recommended at above 7.5%         Should you want to treat your cholesterol with a statin, let me know.   Otherwise, plan to recheck yearly.      The 10-year ASCVD risk score (Escobar FLORES, et al., 2019) is: 6.7%    Values used to calculate the score:      Age: 67 years      Sex: Female      Is Non- : No      Diabetic: No      Tobacco smoker: No      Systolic Blood Pressure: 146 mmHg      Is BP treated: No      HDL Cholesterol: 84 mg/dL      Total Cholesterol: 246 mg/dL    Resulted Orders   Basic metabolic panel   Result Value Ref Range    Sodium 138 136 - 145 mmol/L    Potassium 3.9 3.4 - 5.3 mmol/L    Chloride 103 98 - 107 mmol/L    Carbon Dioxide (CO2) 23 22 - 29 mmol/L    Anion Gap 12 7 - 15 mmol/L    Urea Nitrogen 22.7 8.0 - 23.0 mg/dL    Creatinine 0.67 0.51 - 0.95 mg/dL    Calcium 9.5 8.8 - 10.2 mg/dL    Glucose 96 70 - 99 mg/dL    GFR Estimate >90 >60 mL/min/1.73m2      Comment:      eGFR calculated using 2021 CKD-EPI equation.   TSH with free T4 reflex   Result Value Ref Range    TSH 2.23 0.30 - 4.20 uIU/mL   Glucose   Result Value Ref Range    Glucose 96 70 - 99 mg/dL    Patient Fasting > 8hrs? Unknown    Lipid panel reflex to direct LDL Fasting   Result Value Ref Range    Cholesterol 246 (H) <200 mg/dL    Triglycerides 57 <150 mg/dL    Direct Measure HDL 84 >=50 mg/dL    LDL Cholesterol Calculated 151 (H) <=100 mg/dL    Non HDL Cholesterol 162 (H) <130 mg/dL    Narrative    Cholesterol  Desirable:  <200 mg/dL    Triglycerides  Normal:  Less " than 150 mg/dL  Borderline High:  150-199 mg/dL  High:  200-499 mg/dL  Very High:  Greater than or equal to 500 mg/dL    Direct Measure HDL  Female:  Greater than or equal to 50 mg/dL   Male:  Greater than or equal to 40 mg/dL    LDL Cholesterol  Desirable:  <100mg/dL  Above Desirable:  100-129 mg/dL   Borderline High:  130-159 mg/dL   High:  160-189 mg/dL   Very High:  >= 190 mg/dL    Non HDL Cholesterol  Desirable:  130 mg/dL  Above Desirable:  130-159 mg/dL  Borderline High:  160-189 mg/dL  High:  190-219 mg/dL  Very High:  Greater than or equal to 220 mg/dL       If you have any questions or concerns, please call the clinic at the number listed above.       Sincerely,      Lala Lamar MD

## 2023-05-13 ENCOUNTER — LAB (OUTPATIENT)
Dept: INTERNAL MEDICINE | Facility: CLINIC | Age: 68
End: 2023-05-13
Payer: COMMERCIAL

## 2023-05-13 DIAGNOSIS — Z12.11 SCREEN FOR COLON CANCER: ICD-10-CM

## 2023-05-15 NOTE — RESULT ENCOUNTER NOTE
"Lucia Hector    Electrolytes and kidney function are normal.     Thyroid hormone level is normal.    Cholesterol is a little elevated.   Your \"10 year cardiovascular risk\" can be calculated using the cholesterol values and your demographics (See below).   Your risk is considered to be at 6.7%.     Medication is strongly recommended at above 7.5%         Should you want to treat your cholesterol with a statin, let me know.   Otherwise, plan to recheck yearly.     Sincerely,       DONELL ALBA M.D.    =======================================================  The 10-year ASCVD risk score (Escobar FLORES, et al., 2019) is: 6.7%    Values used to calculate the score:      Age: 67 years      Sex: Female      Is Non- : No      Diabetic: No      Tobacco smoker: No      Systolic Blood Pressure: 146 mmHg      Is BP treated: No      HDL Cholesterol: 84 mg/dL      Total Cholesterol: 246 mg/dL "

## 2023-07-28 ENCOUNTER — OFFICE VISIT (OUTPATIENT)
Dept: INTERNAL MEDICINE | Facility: CLINIC | Age: 68
End: 2023-07-28
Payer: OTHER MISCELLANEOUS

## 2023-07-28 VITALS
BODY MASS INDEX: 27.29 KG/M2 | HEIGHT: 63 IN | SYSTOLIC BLOOD PRESSURE: 113 MMHG | HEART RATE: 78 BPM | TEMPERATURE: 98.1 F | WEIGHT: 154 LBS | DIASTOLIC BLOOD PRESSURE: 72 MMHG | OXYGEN SATURATION: 97 %

## 2023-07-28 DIAGNOSIS — S81.811D LACERATION OF RIGHT LOWER EXTREMITY, SUBSEQUENT ENCOUNTER: Primary | ICD-10-CM

## 2023-07-28 PROCEDURE — 99207 PR NO BILLABLE SERVICE THIS VISIT: CPT | Performed by: INTERNAL MEDICINE

## 2023-07-28 NOTE — Clinical Note
This should be a no charge visit.  I was simply uncomfortable with this task after removing the first stitch.  She will have this done at [facility].   Thank you!!

## 2023-07-28 NOTE — PROGRESS NOTES
"  Assessment & Plan     Laceration of right lower extremity, subsequent encounter  Attempted to remove stitching: apparent this is not a style of stitching writer is familiar with  Worried about leaving foreign body behind.    Patient d/c instructions, as it turns out, specifically say to return to ER to have these stitches removed    Apologized to patient, unable to complete this task competently due to style of stitching.     This will be no charge visit    Patient will to to [facility] and have removed there  Writer called ER to notify them       No LOS data to display   Time spent by me doing chart review, history and exam, documentation and further activities per the note      MED REC REQUIRED Lala Lamar MD  [facility] JAY Myers is a 67 year old, presenting for the following health issues:  Hospital F/U    66 y/o F here for ER follow up     Lives in [facility] in [place].  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      Her 93 y/o  mother is now living with her after hip fx...       H/o hyperlipidemia, Low Vit D, hyperlipidemia, VELASQUEZ, traumatic brain injury   In ER 7/15/23 due to laceration of shins after walking/tripping into/against some steps .   On 7/15/23, A 10 cm anterior laceration repaired using interrupted sutures/horizontal mattress stitches.       HPI     ED/UC Followup:    Facility:  [facility]  Date of visit: 7/15/23  Reason for visit: Laceration of shin-needs sutures removed   Current Status        Review of Systems   no fevers, chills nor discharge from wound.   Constitutional, HEENT, cardiovascular, pulmonary, gi and gu systems are negative, except as otherwise noted.      Objective    /72 (BP Location: Right arm, Patient Position: Sitting, Cuff Size: Adult Regular)   Pulse 78   Temp 98.1  F (36.7  C) (Oral)   Ht 1.6 m (5' 3\")   Wt 69.9 kg (154 lb)   LMP 07/12/2008   SpO2 97%   BMI 27.28 kg/m    There is no height or weight on file to calculate " BMI.  Physical Exam     Stichted laceration at R lower shin  Healing well.   No signs of infection.  There is certainly some inflammation.

## 2023-08-08 ENCOUNTER — TELEPHONE (OUTPATIENT)
Dept: INTERNAL MEDICINE | Facility: CLINIC | Age: 68
End: 2023-08-08
Payer: COMMERCIAL

## 2023-08-08 NOTE — LETTER
August 22, 2023      Lucia RIZVI Eng  [address]  [facility] [address]        August 22, 2023      Lucia RIZVI Eng  [address]  [facility] [address]    Your team at [facility] cares about your health. We have reviewed your chart and based on our findings; we are making the following recommendations to better manage your health.     You are in particular need of attention regarding the following:     Call or MyChart message your clinic to schedule a colonoscopy, schedule/ a FIT Test, or order a Cologuard test. If you are unsure what type of test you need, please call your clinic and speak to clinic staff.   Colon cancer is now the second leading cause of cancer-related deaths in the [place] for both men and women and there are over 130,000 new cases and 50,000 deaths per year from colon cancer. Colonoscopies can prevent 90-95% of these deaths. Problem lesions can be removed before they ever become cancer. This test is not only looking for cancer, but also getting rid of precancerous lesions.   If you are under/uninsured, we recommend you contact the [employer] Program.[employer] is a free colorectal cancer screening program that provides colonoscopies for eligible under/uninsured [place] men and women. If you are interested in receiving a free colonoscopy, please call [employer] at t [phone] (mention code [employer]) to see if you're eligible. Please have them send us the results.     If you have already completed these items, please contact the clinic via phone or   [employer] so your care team can review and update your records. Thank you for   choosing [facility] for your healthcare needs. For any questions,   concerns, or to schedule an appointment please contact our clinic.    Healthy Regards,      Your [facility] Care Team              Sincerely,        Lala Lamar MD

## 2023-08-08 NOTE — TELEPHONE ENCOUNTER
Patient Quality Outreach    Patient is due for the following:   Colon Cancer Screening    Next Steps:   Due for colon cancer screening     Type of outreach:    Sent [employer]pw message.    Next Steps:  Reach out within 90 days via Letter.    Max number of attempts reached: No. Will try again in 90 days if patient still on fail list.    Questions for provider review:    None           Abida Fang CMA  Chart routed to Care Team.       (2) cough or sneeze

## 2023-08-22 NOTE — TELEPHONE ENCOUNTER
Patient Quality Outreach    Patient is due for the following:   Colon Cancer Screening    Next Steps:   Due for a colonoscopy    Type of outreach:    Sent letter.    Next Steps:  Reach out within 90 days via Letter.    Max number of attempts reached: Yes. Will try again in 90 days if patient still on fail list.    Questions for provider review:    None           Abida Fang CMA  Chart routed to closing chart .

## 2023-11-17 ENCOUNTER — TELEPHONE (OUTPATIENT)
Dept: INTERNAL MEDICINE | Facility: CLINIC | Age: 68
End: 2023-11-17
Payer: COMMERCIAL

## 2023-11-17 NOTE — TELEPHONE ENCOUNTER
Patient Quality Outreach    Patient is due for the following:   Colon Cancer Screening    Next Steps:   Due for a colonoscopy    Type of outreach:    Sent Push[employer]in message.    Next Steps:  Reach out within 90 days via PushCoin.    Max number of attempts reached: No. Will try again in 90 days if patient still on fail list.    Questions for provider review:    None           Abida Fang CMA  Chart routed to Care Team.

## 2023-11-17 NOTE — LETTER
December 4, 2023      Lucia RIZVI Eng  [address]  [facility] [address]        December 4, 2023      Lucia RIZVI Eng  [address]  [facility] [address]    Your team at [facility] cares about your health. We have reviewed your chart and based on our findings; we are making the following recommendations to better manage your health.     You are in particular need of attention regarding the following:     Call or MyChart message your clinic to schedule a colonoscopy, schedule/ a FIT Test, or order a Cologuard test. If you are unsure what type of test you need, please call your clinic and speak to clinic staff.   Colon cancer is now the second leading cause of cancer-related deaths in the [place] for both men and women and there are over 130,000 new cases and 50,000 deaths per year from colon cancer. Colonoscopies can prevent 90-95% of these deaths. Problem lesions can be removed before they ever become cancer. This test is not only looking for cancer, but also getting rid of precancerous lesions.   If you are under/uninsured, we recommend you contact the [employer] Program.[employer] is a free colorectal cancer screening program that provides colonoscopies for eligible under/uninsured [place] men and women. If you are interested in receiving a free colonoscopy, please call [employer] at t [phone] (mention code [employer]) to see if you're eligible. Please have them send us the results.     If you have already completed these items, please contact the clinic via phone or   [employer] so your care team can review and update your records. Thank you for   choosing [facility] for your healthcare needs. For any questions,   concerns, or to schedule an appointment please contact our clinic.    Healthy Regards,      Your [facility] Care Team              Sincerely,        [facility]

## 2023-12-14 NOTE — TELEPHONE ENCOUNTER
Called patient. Patient states she is at work and will call later.  Yessenia See ABRAM Llanes   [facility] [place]  [facility]IOLOG[facility]  [address] [place] [address]  Dept: [phone]  Dept Fax: [phone]    Tony Rhodes  1968    Follow Up Office Visit Note    History of Present Illness:  Tony Rhodes is a 55 y.o. female who presents to the clinic for Follow-up.PASVT-she ststes has been having palpitations like skip beats for 3 days, however exam and EKG plus tracings are normal will get a Holter for 3 days, she did have a visit to ER in 2015 with likely SVT and , seems she got better with adenosine,  she is only on Cardizem PRN, , will see the Holter, she also has a diastolic murmur and prior echo has mild AI, will get an new echo    The following portions of the patient's history were reviewed and updated as appropriate: allergies, current medications, past family history, past medical history, past social history, past surgical history, and problem list.    Medications:  amphetamine-dextroamphetamine  buprenorphine-naloxone  dilTIAZem CD  FLUoxetine  metFORMIN  omeprazole  rosuvastatin  Vyvanse capsule    Subjective  No Known Allergies     Past Medical History:   Diagnosis Date   • Benign hypertension 12/3/2021   • Hyperlipidemia LDL goal <70 5/25/2022   • Uncontrolled type 2 diabetes mellitus with hyperglycemia 12/3/2021       Past Surgical History:   Procedure Laterality Date   • BREAST SURGERY     • LIPOSUCTION     • TONSILLECTOMY         Family History   Problem Relation Age of Onset   • Diabetes Maternal Uncle    • Lung cancer Paternal Aunt    • Brain cancer Paternal Uncle    • Cancer Paternal Grandmother    • Uterine cancer Paternal Grandmother         Social History     Socioeconomic History   • Marital status:    Tobacco Use   • Smoking status: Former     Packs/day: 0.50     Years: 20.00     Additional pack years: 0.00     Total pack years: 10.00     Types: Cigarettes     Start date: 11/25/1987     Quit date: 3/10/2017     Years since  "quittin.7     Passive exposure: Past   • Smokeless tobacco: Never   Vaping Use   • Vaping Use: Every day   • Substances: Nicotine, Flavoring   • Devices: Disposable   • Passive vaping exposure: Yes   Substance and Sexual Activity   • Alcohol use: Not Currently     Alcohol/week: 0.0 standard drinks of alcohol     Comment: I don't really drink. Used to before diabetes.   • Drug use: Not Currently   • Sexual activity: Yes     Partners: Male     Comment: My  has had a vasectomy       Review of Systems   Constitutional: Negative.    HENT: Negative.     Respiratory: Negative.     Cardiovascular:  Positive for palpitations.   Endocrine: Negative.    Genitourinary: Negative.    Musculoskeletal: Negative.    Skin: Negative.    Allergic/Immunologic: Negative.    Neurological: Negative.    Hematological: Negative.    Psychiatric/Behavioral: Negative.         Cardiovascular Procedures    ECHO/MUGA:  STRESS TESTS:   CARDIAC CATH:   DEVICES:   HOLTER:   CT/MRI:   VASCULAR:   CARDIOTHORACIC:     Objective  Vitals:    23 0900   BP: 118/74   BP Location: Right arm   Patient Position: Lying   Cuff Size: Large Adult   Pulse: 82   Resp: 18   SpO2: 97%   Weight: 108 kg (237 lb)   Height: 170.2 cm (67\")   PainSc: 0-No pain     Body mass index is 37.12 kg/m².     Physical Exam  Vitals reviewed.   Constitutional:       Appearance: Healthy appearance. Not in distress.   Neck:      Vascular: No JVR. JVD normal.   Pulmonary:      Effort: Pulmonary effort is normal.      Breath sounds: Normal breath sounds. No wheezing. No rhonchi. No rales.   Chest:      Chest wall: Not tender to palpatation.   Cardiovascular:      PMI at left midclavicular line. Normal rate. Regular rhythm. Normal S1. Normal S2.       Murmurs: There is a grade 2/4 high frequency blowing decrescendo, early diastolic murmur at the URSB, radiating to the apex.      No gallop.  No click. No rub.   Pulses:     Intact distal pulses.   Edema:     Peripheral edema " absent.   Abdominal:      General: Bowel sounds are normal.      Palpations: Abdomen is soft.      Tenderness: There is no abdominal tenderness.   Musculoskeletal: Normal range of motion.         General: No tenderness. Skin:     General: Skin is warm and dry.   Neurological:      General: No focal deficit present.      Mental Status: Alert and oriented to person, place and time.        Diagnostic Data    ECG 12 Lead    Date/Time: 12/14/2023 9:37 AM  Performed by: Neville Sequeira MD    Authorized by: Neville Sequeira MD  Comparison: compared with previous ECG from 6/14/2023  Similar to previous ECG  Rhythm: sinus rhythm  Rate: normal  BPM: 80  QRS axis: normal  Other findings: low voltage    Clinical impression: normal ECG        Assessment and Plan  Diagnoses and all orders for this visit:    Paroxysmal SVT (supraventricular tachycardia)- will get a Holter , she feels daily frequents skip beats,   -     Holter Monitor - 72 Hour Up To 15 Days; Future    FELIPE (obstructive sleep apnea)- On CPAP    Hyperlipidemia LDL goal <70    Nonrheumatic aortic valve insufficiency- Mild AI, by echo many years ago, will get a new echo         No follow-ups on file.    Neville Sequeira MD  12/14/2023

## 2024-05-31 ENCOUNTER — OFFICE VISIT (OUTPATIENT)
Dept: FAMILY MEDICINE | Facility: CLINIC | Age: 69
End: 2024-05-31
Payer: COMMERCIAL

## 2024-05-31 VITALS
OXYGEN SATURATION: 96 % | SYSTOLIC BLOOD PRESSURE: 132 MMHG | WEIGHT: 151 LBS | HEART RATE: 74 BPM | RESPIRATION RATE: 16 BRPM | TEMPERATURE: 97.3 F | BODY MASS INDEX: 26.75 KG/M2 | DIASTOLIC BLOOD PRESSURE: 81 MMHG | HEIGHT: 63 IN
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
## 2024-05-31 DIAGNOSIS — S60.562A TICK BITE OF LEFT HAND, INITIAL ENCOUNTER: Primary | ICD-10-CM

## 2024-05-31 PROCEDURE — 99214 OFFICE O/P EST MOD 30 MIN: CPT | Performed by: FAMILY MEDICINE

## 2024-05-31 RX ORDER — DOXYCYCLINE 100 MG/1
200 CAPSULE ORAL ONCE
Qty: 2 CAPSULE | Refills: 0 | Status: SHIPPED | OUTPATIENT
Start: 2024-05-31 | End: 2024-05-31

## 2024-05-31 RX ORDER — RESPIRATORY SYNCYTIAL VIRUS VACCINE 120MCG/0.5
0.5 KIT INTRAMUSCULAR ONCE
Qty: 1 EACH | Refills: 0 | Status: CANCELLED | OUTPATIENT
Start: 2024-05-31 | End: 2024-05-31

## 2024-05-31 NOTE — PROGRESS NOTES
"  Assessment & Plan   Problem List Items Addressed This Visit    None  Visit Diagnoses       Tick bite of left hand, initial encounter    -  Primary    Relevant Medications    doxycycline hyclate (VIBRAMYCIN) 100 MG capsule           Empiric therapy     BMI  Estimated body mass index is 26.75 kg/m  as calculated from the following:    Height as of this encounter: 1.6 m (5' 3\").    Weight as of this encounter: 68.5 kg (151 lb).       Kamron Myers is a 68 year old, presenting for the following health issues:  Insect Bites (Possible Deer Tick )        5/31/2024     8:52 AM   Additional Questions   Roomed by Elizabeth RICK CMA     History of Present Illness       Reason for visit:  Possible  Symptom onset:  1-3 days ago    She eats 2-3 servings of fruits and vegetables daily.She consumes 1 sweetened beverage(s) daily.She exercises with enough effort to increase her heart rate 60 or more minutes per day.  She exercises with enough effort to increase her heart rate 7 days per week.   She is taking medications regularly.     Gardening, tick bite        Objective    /81 (BP Location: Right arm, Patient Position: Chair, Cuff Size: Adult Regular)   Pulse 74   Temp 97.3  F (36.3  C) (Temporal)   Resp 16   Ht 1.6 m (5' 3\")   Wt 68.5 kg (151 lb)   LMP 07/12/2008   SpO2 96%   BMI 26.75 kg/m    Body mass index is 26.75 kg/m .  Physical Exam   GENERAL: alert and no distress     Media Information      Document Information    Other: Photograph   Left hand   05/31/2024 9:08 AM   Attached To:   Office Visit on 5/31/24 with Hudson Mosher DO   Source Information    Hudson Mosher DO   Family Practice   Document History              Signed Electronically by: HUDSON MOSHER DO    "

## 2024-07-18 ENCOUNTER — OFFICE VISIT (OUTPATIENT)
Dept: FAMILY MEDICINE | Facility: CLINIC | Age: 69
End: 2024-07-18
Payer: COMMERCIAL

## 2024-07-18 VITALS
HEART RATE: 63 BPM | SYSTOLIC BLOOD PRESSURE: 142 MMHG | HEIGHT: 63 IN | TEMPERATURE: 98 F | BODY MASS INDEX: 26.72 KG/M2 | DIASTOLIC BLOOD PRESSURE: 82 MMHG | OXYGEN SATURATION: 100 % | WEIGHT: 150.8 LBS | RESPIRATION RATE: 16 BRPM

## 2024-07-18 DIAGNOSIS — Z13.220 SCREENING FOR HYPERLIPIDEMIA: ICD-10-CM

## 2024-07-18 DIAGNOSIS — Z00.00 MEDICARE ANNUAL WELLNESS VISIT, INITIAL: Primary | ICD-10-CM

## 2024-07-18 DIAGNOSIS — E28.39 ESTROGEN DEFICIENCY: ICD-10-CM

## 2024-07-18 DIAGNOSIS — Z12.11 SCREEN FOR COLON CANCER: ICD-10-CM

## 2024-07-18 DIAGNOSIS — M89.8X1 PAIN OF RIGHT SCAPULA: ICD-10-CM

## 2024-07-18 PROCEDURE — G0438 PPPS, INITIAL VISIT: HCPCS | Performed by: INTERNAL MEDICINE

## 2024-07-18 PROCEDURE — 99213 OFFICE O/P EST LOW 20 MIN: CPT | Mod: 25 | Performed by: INTERNAL MEDICINE

## 2024-07-18 RX ORDER — RESPIRATORY SYNCYTIAL VIRUS VACCINE 120MCG/0.5
0.5 KIT INTRAMUSCULAR ONCE
Qty: 1 EACH | Refills: 0 | Status: CANCELLED | OUTPATIENT
Start: 2024-07-18 | End: 2024-07-18

## 2024-07-18 SDOH — HEALTH STABILITY: PHYSICAL HEALTH: ON AVERAGE, HOW MANY MINUTES DO YOU ENGAGE IN EXERCISE AT THIS LEVEL?: 100 MIN

## 2024-07-18 SDOH — HEALTH STABILITY: PHYSICAL HEALTH: ON AVERAGE, HOW MANY DAYS PER WEEK DO YOU ENGAGE IN MODERATE TO STRENUOUS EXERCISE (LIKE A BRISK WALK)?: 7 DAYS

## 2024-07-18 ASSESSMENT — SOCIAL DETERMINANTS OF HEALTH (SDOH): HOW OFTEN DO YOU GET TOGETHER WITH FRIENDS OR RELATIVES?: MORE THAN THREE TIMES A WEEK

## 2024-07-18 ASSESSMENT — PAIN SCALES - GENERAL: PAINLEVEL: NO PAIN (0)

## 2024-07-18 NOTE — PROGRESS NOTES
"Preventive Care Visit  [facility] KIRILL Coles MD, Internal Medicine  Jul 18, 2024      Assessment & Plan     Medicare annual wellness visit, initial  This is patient's first Medicare annual wellness exam  Her last mammogram was in 2/2022  She declined all vaccinations  We discussed about shingles/RSV/pneumococcal/COVID vaccinations  She is going to schedule her next mammogram by herself as she gets notifications about that and she will do it on her own without need for any referral from me  I advised her to do this ASAP  Up-to-date with Pap smear  - Basic metabolic panel  (Ca, Cl, CO2, Creat, Gluc, K, Na, BUN); Future  - CBC with platelets and differential; Future    Screen for colon cancer  She is not keen to get colonoscopy because she told me that she has a twisted pelvis and she has history of trauma to her left side of body and she is worried that her colon is twisted and if they try to do a colonoscopy it been perforated  We discussed about fit test versus Cologuard  She is not A candidate for Cologuard as she does not know if she has a family history of polyps  - Fecal colorectal cancer screen FIT - Future (S+30); Future  - Fecal colorectal cancer screen FIT - Future (S+30)    Estrogen deficiency    - DEXA HIP/PELVIS/SPINE - Future; Future    Screening for hyperlipidemia    - Lipid panel reflex to direct LDL Fasting; Future    Pain of right scapula  She has history of fracture to right clavicle and also fracture to her right elbow and right wrist  She has chronic pain on the right suprascapular because of this trauma history and she wants to get some physical therapy  - Physical Therapy  Referral; Future    Patient has been advised of split billing requirements and indicates understanding: No        BMI  Estimated body mass index is 26.71 kg/m  as calculated from the following:    Height as of this encounter: 1.6 m (5' 3\").    Weight as of this encounter: 68.4 kg (150 " lb 12.8 oz).   Weight management plan: Discussed healthy diet and exercise guidelines    Counseling  Appropriate preventive services were addressed with this patient via screening, questionnaire, or discussion as appropriate for fall prevention, nutrition, physical activity, Tobacco-use cessation, weight loss and cognition.  Checklist reviewing preventive services available has been given to the patient.  Reviewed patient's diet, addressing concerns and/or questions.   Discussed possible causes of fatigue.         Kamron Myers is a 68 year old, presenting for the following:  Medicare Visit and Establish Care (Would like a primary care provider )        7/18/2024     1:47 PM   Additional Questions   Roomed by velia   Accompanied by self         7/18/2024     1:47 PM   Patient Reported Additional Medications   Patient reports taking the following new medications none         Health Care Directive  Patient does not have a Health Care Directive or Living Will: Discussed advance care planning with patient; however, patient declined at this time.    HPI  Would like to discuss upper back pain and would like a referral to physical therapy and chiro for alignment issues              7/18/2024   General Health   How would you rate your overall physical health? Excellent   Feel stress (tense, anxious, or unable to sleep) To some extent      (!) STRESS CONCERN      7/18/2024   Nutrition   Diet: Regular (no restrictions)            7/18/2024   Exercise   Days per week of moderate/strenous exercise 7 days   Average minutes spent exercising at this level 100 min            7/18/2024   Social Factors   Frequency of gathering with friends or relatives More than three times a week   Worry food won't last until get money to buy more No   Food not last or not have enough money for food? No   Do you have housing? (Housing is defined as stable permanent housing and does not include staying ouside in a car, in a tent, in an  abandoned building, in an overnight shelter, or couch-surfing.) Yes   Are you worried about losing your housing? No   Lack of transportation? No   Unable to get utilities (heat,electricity)? No            2024   Fall Risk   Fallen 2 or more times in the past year? Yes   Trouble with walking or balance? Yes              2024   Activities of Daily Living- Home Safety   Needs help with the following daily activites None of the above   Safety concerns in the home None of the above            2024   Dental   Dentist two times every year? Yes            2024   Hearing Screening   Hearing concerns? None of the above            2024   Driving Risk Screening   Patient/family members have concerns about driving No            2024   General Alertness/Fatigue Screening   Have you been more tired than usual lately? (!) YES            2024   Urinary Incontinence Screening   Bothered by leaking urine in past 6 months No            2024   TB Screening   Were you born outside of the [place]? No            Today's PHQ-2 Score:       2024    12:11 PM   PHQ-2 ( 1999 Pf[employer])   Q1: Little interest or pleasure in doing things 0   Q2: Feeling down, depressed or hopeless 0   PHQ-2 Score 0   Q1: Little interest or pleasure in doing things Not at all   Q2: Feeling down, depressed or hopeless Not at all   PHQ-2 Score 0           2024   Substance Use   Alcohol more than 3/day or more than 7/wk Not Applicable   Do you have a current opioid prescription? No   How severe/bad is pain from 1 to 10? 8/10   Do you use any other substances recreationally? No    (!) OTHER       Multiple values from one day are sorted in reverse-chronological order     Social History     Tobacco Use     Smoking status: Former     Current packs/day: 0.00     Types: Cigarettes     Start date: 2/15/1978     Quit date: 1/15/1993     Years since quittin.5     Passive exposure: Past     Smokeless tobacco: Never   Vaping Use      Vaping status: Never Used   Substance Use Topics     Alcohol use: No     Drug use: No           10/31/2022   LAST FHS-7 RESULTS   1st degree relative breast or ovarian cancer No   Any relative bilateral breast cancer No   Any male have breast cancer No   Any ONE woman have BOTH breast AND ovarian cancer No   Any woman with breast cancer before 50yrs No   2 or more relatives with breast AND/OR ovarian cancer No   2 or more relatives with breast AND/OR bowel cancer No                 History of abnormal Pap smear: No - age 65 or older with adequate negative prior screening test results (3 consecutive negative cytology results, 2 consecutive negative cotesting results, or 2 consecutive negative HrHPV test results within 10 years, with the most recent test occurring within the recommended screening interval for the test used)        Latest Ref Rng & Units 2020    10:45 AM 2020    10:40 AM 2014    12:00 AM   PAP / HPV   PAP (Historical)   NIL  NIL    HPV 16 DNA NEG^Negative Negative      HPV 18 DNA NEG^Negative Negative      Other HR HPV NEG^Negative Negative        ASCVD Risk   The 10-year ASCVD risk score (Escobar FLORES, et al., 2019) is: 7.1%    Values used to calculate the score:      Age: 68 years      Sex: Female      Is Non- : No      Diabetic: No      Tobacco smoker: No      Systolic Blood Pressure: 142 mmHg      Is BP treated: No      HDL Cholesterol: 84 mg/dL      Total Cholesterol: 246 mg/dL    Fracture Risk Assessment Tool  Link to Frax Calculator  Use the information below to complete the Frax calculator  : 1955  Sex: female  Weight (kg): 68.4 kg (actual weight)  Height (cm): 160 cm  Previous Fragility Fracture:  Yes  History of parent with fractured hip:  No  Current Smoking:  No  Patient has been on glucocorticoids for more than 3 months (5mg/day or more): No  Rheumatoid Arthritis on Problem List:  No  Secondary Osteoporosis on Problem List:   "No  Consumes 3 or more units of alcohol per day: Yes  Femoral Neck BMD (g/cm2)            Reviewed and updated as needed this visit by Provider                      Current providers sharing in care for this patient include:  Patient Care Team:  Clinic - [facility] as PCP - Hudson Cronin DO as Assigned PCP    The following health maintenance items are reviewed in Epic and correct as of today:  Health Maintenance   Topic Date Due     DEXA  Never done     ZOSTER IMMUNIZATION (1 of 2) Never done     COLORECTAL CANCER SCREENING  08/13/2016     RSV VACCINE (Pregnancy & 60+) (1 - 1-dose 60+ series) Never done     Pneumococcal Vaccine: 65+ Years (1 of 1 - PCV) Never done     COVID-19 Vaccine (3 - 2023-24 season) 09/01/2023     LIPID  05/12/2024     INFLUENZA VACCINE (1) 09/01/2024     MAMMO SCREENING  10/31/2024     PAP FOLLOW-UP  01/23/2025     HPV FOLLOW-UP  01/23/2025     MEDICARE ANNUAL WELLNESS VISIT  07/18/2025     ANNUAL REVIEW OF HM ORDERS  07/18/2025     FALL RISK ASSESSMENT  07/18/2025     GLUCOSE  05/12/2026     ADVANCE CARE PLANNING  05/12/2028     DTAP/TDAP/TD IMMUNIZATION (3 - Td or Tdap) 01/25/2031     HEPATITIS C SCREENING  Completed     PHQ-2 (once per calendar year)  Completed     IPV IMMUNIZATION  Aged Out     HPV IMMUNIZATION  Aged Out     MENINGITIS IMMUNIZATION  Aged Out     RSV MONOCLONAL ANTIBODY  Aged Out         Review of Systems  Constitutional, neuro, ENT, endocrine, pulmonary, cardiac, gastrointestinal, genitourinary, musculoskeletal, integument and psychiatric systems are negative, except as otherwise noted.     Objective    Exam  BP (!) 142/82 (BP Location: Left arm, Patient Position: Sitting, Cuff Size: Adult Regular)   Pulse 63   Temp 98  F (36.7  C) (Oral)   Resp 16   Ht 1.6 m (5' 3\")   Wt 68.4 kg (150 lb 12.8 oz)   LMP 07/12/2008   SpO2 100%   BMI 26.71 kg/m     Estimated body mass index is 26.71 kg/m  as calculated from the following:    Height " "as of this encounter: 1.6 m (5' 3\").    Weight as of this encounter: 68.4 kg (150 lb 12.8 oz).    Physical Exam  GENERAL: alert and no distress  EYES: Eyes grossly normal to inspection, PERRL and conjunctivae and sclerae normal  HENT: ear canals and TM's normal, nose and mouth without ulcers or lesions  NECK: no adenopathy, no asymmetry, masses, or scars  RESP: lungs clear to auscultation - no rales, rhonchi or wheezes  CV: regular rate and rhythm, normal S1 S2, no S3 or S4, no murmur, click or rub, no peripheral edema  ABDOMEN: soft, nontender, no hepatosplenomegaly, no masses and bowel sounds normal  MS: Range of movements normal in all the joints  She has some tenderness on palpation below the right scapula and the right trapezius area  SKIN: no suspicious lesions or rashes  NEURO: Normal strength and tone, mentation intact and speech normal  PSYCH: mentation appears normal, affect normal/bright        7/18/2024   Mini Cog   Mini-Cog Not Completed (choose reason) Patient declines                7/18/2024   Vision Screen   Vision Screen Results Pass   No Corrective Lenses, PLUS LENS REQUIRED Pass          Signed Electronically by: Pro Coles MD    "

## 2024-07-19 ENCOUNTER — LAB (OUTPATIENT)
Dept: LAB | Facility: CLINIC | Age: 69
End: 2024-07-19
Payer: COMMERCIAL

## 2024-07-19 DIAGNOSIS — Z00.00 MEDICARE ANNUAL WELLNESS VISIT, INITIAL: ICD-10-CM

## 2024-07-19 DIAGNOSIS — Z13.220 SCREENING FOR HYPERLIPIDEMIA: ICD-10-CM

## 2024-07-19 LAB
ANION GAP SERPL CALCULATED.3IONS-SCNC: 9 MMOL/L (ref 7–15)
BASOPHILS # BLD AUTO: 0 10E3/UL (ref 0–0.2)
BASOPHILS NFR BLD AUTO: 1 %
BUN SERPL-MCNC: 16.4 MG/DL (ref 8–23)
CALCIUM SERPL-MCNC: 9.2 MG/DL (ref 8.8–10.4)
CHLORIDE SERPL-SCNC: 108 MMOL/L (ref 98–107)
CHOLEST SERPL-MCNC: 233 MG/DL
CREAT SERPL-MCNC: 0.72 MG/DL (ref 0.51–0.95)
EGFRCR SERPLBLD CKD-EPI 2021: >90 ML/MIN/1.73M2
EOSINOPHIL # BLD AUTO: 0.1 10E3/UL (ref 0–0.7)
EOSINOPHIL NFR BLD AUTO: 3 %
ERYTHROCYTE [DISTWIDTH] IN BLOOD BY AUTOMATED COUNT: 12.6 % (ref 10–15)
FASTING STATUS PATIENT QL REPORTED: YES
FASTING STATUS PATIENT QL REPORTED: YES
GLUCOSE SERPL-MCNC: 100 MG/DL (ref 70–99)
HCO3 SERPL-SCNC: 27 MMOL/L (ref 22–29)
HCT VFR BLD AUTO: 36.2 % (ref 35–47)
HDLC SERPL-MCNC: 75 MG/DL
HGB BLD-MCNC: 12 G/DL (ref 11.7–15.7)
IMM GRANULOCYTES # BLD: 0 10E3/UL
IMM GRANULOCYTES NFR BLD: 0 %
LDLC SERPL CALC-MCNC: 145 MG/DL
LYMPHOCYTES # BLD AUTO: 1.2 10E3/UL (ref 0.8–5.3)
LYMPHOCYTES NFR BLD AUTO: 39 %
MCH RBC QN AUTO: 29.8 PG (ref 26.5–33)
MCHC RBC AUTO-ENTMCNC: 33.1 G/DL (ref 31.5–36.5)
MCV RBC AUTO: 90 FL (ref 78–100)
MONOCYTES # BLD AUTO: 0.3 10E3/UL (ref 0–1.3)
MONOCYTES NFR BLD AUTO: 10 %
NEUTROPHILS # BLD AUTO: 1.4 10E3/UL (ref 1.6–8.3)
NEUTROPHILS NFR BLD AUTO: 47 %
NONHDLC SERPL-MCNC: 158 MG/DL
PLATELET # BLD AUTO: 228 10E3/UL (ref 150–450)
POTASSIUM SERPL-SCNC: 4 MMOL/L (ref 3.4–5.3)
RBC # BLD AUTO: 4.03 10E6/UL (ref 3.8–5.2)
SODIUM SERPL-SCNC: 144 MMOL/L (ref 135–145)
TRIGL SERPL-MCNC: 66 MG/DL
WBC # BLD AUTO: 3.1 10E3/UL (ref 4–11)

## 2024-07-19 PROCEDURE — 80061 LIPID PANEL: CPT

## 2024-07-19 PROCEDURE — 85025 COMPLETE CBC W/AUTO DIFF WBC: CPT

## 2024-07-19 PROCEDURE — 80048 BASIC METABOLIC PNL TOTAL CA: CPT

## 2024-07-19 PROCEDURE — 36415 COLL VENOUS BLD VENIPUNCTURE: CPT

## 2024-08-09 ENCOUNTER — THERAPY VISIT (OUTPATIENT)
Dept: PHYSICAL THERAPY | Facility: CLINIC | Age: 69
End: 2024-08-09
Attending: INTERNAL MEDICINE
Payer: COMMERCIAL

## 2024-08-09 DIAGNOSIS — M89.8X1 PAIN OF RIGHT SCAPULA: ICD-10-CM

## 2024-08-09 DIAGNOSIS — R26.81 UNSTEADINESS ON FEET: Primary | ICD-10-CM

## 2024-08-09 PROCEDURE — 97110 THERAPEUTIC EXERCISES: CPT | Mod: GP | Performed by: PHYSICAL THERAPIST

## 2024-08-09 PROCEDURE — 97161 PT EVAL LOW COMPLEX 20 MIN: CPT | Mod: GP | Performed by: PHYSICAL THERAPIST

## 2024-08-09 ASSESSMENT — ACTIVITIES OF DAILY LIVING (ADL)
GETTING INTO AND OUT OF AN AVERAGE CAR: SLIGHT DIFFICULTY
SITTING FOR 15 MINUTES: SLIGHT DIFFICULTY
RECREATIONAL_ACTIVITIES: NO DIFFICULTY AT ALL
HEAVY_WORK: SLIGHT DIFFICULTY
HEAVY_WORK: SLIGHT DIFFICULTY
WALKING_FOR_APPROXIMATELY_10_MINUTES: NO DIFFICULTY AT ALL
HOS_ADL_ITEM_SCORE_TOTAL: 64
STEPPING_UP_AND_DOWN_CURBS: NO DIFFICULTY AT ALL
ADL_SCORE(%): 0
LIGHT_TO_MODERATE_WORK: NO DIFFICULTY AT ALL
RECREATIONAL ACTIVITIES: NO DIFFICULTY AT ALL
HOW_WOULD_YOU_RATE_YOUR_CURRENT_LEVEL_OF_FUNCTION?: NEARLY NORMAL
HOS_ADL_HIGHEST_POTENTIAL_SCORE: 68
WALKING_DOWN_STEEP_HILLS: NO DIFFICULTY AT ALL
ROLLING OVER IN BED: SLIGHT DIFFICULTY
TWISTING/PIVOTING ON INVOLVED LEG: NO DIFFICULTY AT ALL
ROLLING_OVER_IN_BED: SLIGHT DIFFICULTY
GOING_UP_1_FLIGHT_OF_STAIRS: NO DIFFICULTY AT ALL
LOW_IMPACT_ACTIVITIES_LIKE_FAST_WALKING: NO DIFFICULTY AT ALL
GOING UP 1 FLIGHT OF STAIRS: NO DIFFICULTY AT ALL
WALKING_DOWN_STEEP_HILLS: NO DIFFICULTY AT ALL
SPORTS_TOTAL_ITEM_SCORE: 0
WALKING_INITIALLY: NO DIFFICULTY AT ALL
PLEASE_INDICATE_YOR_PRIMARY_REASON_FOR_REFERRAL_TO_THERAPY:: HIP
GOING_DOWN_1_FLIGHT_OF_STAIRS: NO DIFFICULTY AT ALL
WALKING_15_MINUTES_OR_GREATER: NO DIFFICULTY AT ALL
CUTTING/LATERAL_MOVEMENTS: SLIGHT DIFFICULTY
WALKING_APPROXIMATELY_10_MINUTES: NO DIFFICULTY AT ALL
ADL_HIGHEST_POTENTIAL_SCORE: 68
STARTING_AND_STOPPING_QUICKLY: NO DIFFICULTY AT ALL
ABILITY_TO_PERFORM_ACTIVITY_WITH_YOUR_NORMAL_TECHNIQUE: NO DIFFICULTY AT ALL
DEEP_SQUATTING: NO DIFFICULTY AT ALL
SITTING_FOR_15_MINUTES: SLIGHT DIFFICULTY
PLEASE_INDICATE_YOR_PRIMARY_REASON_FOR_REFERRAL_TO_THERAPY:: SHOULDER
RUNNING_ONE_MILE: SLIGHT DIFFICULTY
JUMPING: NO DIFFICULTY AT ALL
SPORTS_HIGHEST_POTENTIAL_SCORE: 36
DEEP SQUATTING: NO DIFFICULTY AT ALL
WALKING_UP_STEEP_HILLS: NO DIFFICULTY AT ALL
PUTTING ON SOCKS AND SHOES: NO DIFFICULTY AT ALL
TWISTING/PIVOTING_ON_INVOLVED_LEG: NO DIFFICULTY AT ALL
GETTING_INTO_AND_OUT_OF_A_BATHTUB: NO DIFFICULTY AT ALL
ADL_COUNT: 17
WALKING_INITIALLY: NO DIFFICULTY AT ALL
ADL_TOTAL_ITEM_SCORE: 0
HOS_ADL_SCORE(%): 94.12
ABILITY_TO_PARTICIPATE_IN_YOUR_DESIRED_SPORT_AS_LONG_AS_YOU_WOULD_LIKE: SLIGHT DIFFICULTY
SPORTS_COUNT: 9
STANDING_FOR_15_MINUTES: SLIGHT DIFFICULTY
WALKING_UP_STEEP_HILLS: NO DIFFICULTY AT ALL
LIGHT_TO_MODERATE_WORK: NO DIFFICULTY AT ALL
LANDING: SLIGHT DIFFICULTY
GETTING_INTO_AND_OUT_OF_AN_AVERAGE_CAR: SLIGHT DIFFICULTY
WALKING_15_MINUTES_OR_GREATER: NO DIFFICULTY AT ALL
GOING DOWN 1 FLIGHT OF STAIRS: NO DIFFICULTY AT ALL
SPORTS_SCORE(%): 0
STANDING FOR 15 MINUTES: SLIGHT DIFFICULTY
GETTING_INTO_AND_OUT_OF_A_BATHTUB: NO DIFFICULTY AT ALL
STEPPING UP AND DOWN CURBS: NO DIFFICULTY AT ALL
PUTTING_ON_SOCKS_AND_SHOES: NO DIFFICULTY AT ALL
SWINGING_OBJECTS_LIKE_A_GOLF_CLUB: SLIGHT DIFFICULTY
AT_ITS_WORST?: 7

## 2024-08-09 NOTE — PROGRESS NOTES
PHYSICAL THERAPY EVALUATION  Type of Visit: Evaluation        Fall Risk Screen:  Fall screen completed by: PT  Have you fallen 2 or more times in the past year?: Yes  Have you fallen and had an injury in the past year?: Yes  Is patient a fall risk?: Yes  SL balance see below. Continue to monitor.     Subjective       Presenting condition or subjective complaint: Misalignment right shoulder and left hip.  This has caused me to have accidents and falls.    Has been seeing chiro and massage therapist in the past 2 weeks. Has had 2 appointments with each of them. Feels that she is consistently misaligned. Reports that she fell rollerblading (wrist fracture 2010 and then also fell in her garden and broke her R elbow. 2011, 2012 R clavicle while bike riding. Has been having falls lately because her pelvis is so twisted that her left leg gets stuck. Usually falls when on the pavement. Does 1.5 hours of stretching/day. Also has a hard time sleeping because of discomfort. When she was working, then she had chronic fatigue because she was having a hard time finding a comfortable position. Likes to hike and walk but feels that this is limited due to her pain. Didn't do her stretches yesterday and then fell down on the side walk. Works as a . Hx of TBI.       Date of onset: 07/18/24 (MD order date)    Relevant medical history:     Dates & types of surgery: 2010-shattered R Wrist rollerblading, 2011- fx R elbow, 2012 Fx R clavicle.    Prior diagnostic imaging/testing results:       Prior therapy history for the same diagnosis, illness or injury: No      Living Environment  Social support: Alone   Type of home: House   Stairs to enter the home: Yes 3 Is there a railing: Yes     Ramp: No   Stairs inside the home: Yes 12 Is there a railing: Yes     Help at home: None  Equipment owned:       Employment: Not Applicable    Hobbies/Interests: Hiking, swimming, biking    Patient goals for therapy: I do a lot of  stretches and have now began seeing a chiropractor and a masseuse. I am looking for some help and evaluation of my stretching routine and suggestions and tips for anything that might help me to get things back in alignment.    Pain assessment: See objective evaluation for additional pain details     Objective   EVALUATION  PAIN: very minimal pain reported  POSTURE:  R shoulder elevated, Right hip elevated, R scapula elevated, mild scoliosis noted, lumbar lordosis exaggerated   GAIT:   Weightbearing Status:   Assistive Device(s):   Gait Deviations:  knee valgus and knee hyperextension noted, mild instability noted   ROM:  Lumbar flexion; reaches floor painfree, extension mod limited stiffness, sidebending finger tips to knees painfree. Shoulder AROM flexion 160 deg R 180 deg L    STRENGTH:  generalized decreased LE strength noted during observation of patient's performance of existing exercise program.   JOINT MOBILITY:   CERVICAL SCREEN:   Functional movement: difficulty with standing<>floor transition, difficulty with eccentric control to the floor.   SL Balance: <5 seconds bishnu without UE support, 10 sec L, 8 sec R with intermittent UE support, bishnu hip drop     Assessment & Plan   CLINICAL IMPRESSIONS  Medical Diagnosis: Pain of right scapula    Treatment Diagnosis: Right Shoulder/Scapular Pain/Gait Instability   Impression/Assessment: Patient is a 68 year old female with shoulder/hip complaints.  The following significant findings have been identified: Pain, Decreased ROM/flexibility, Decreased joint mobility, Decreased strength, Inflammation, Impaired gait, Impaired muscle performance, Decreased activity tolerance, and Impaired posture. These impairments interfere with their ability to perform self care tasks, work tasks, recreational activities, household chores, driving , household mobility, and community mobility as compared to previous level of function.     Clinical Decision Making (Complexity):  Clinical  Presentation: Stable/Uncomplicated  Clinical Presentation Rationale: based on medical and personal factors listed in PT evaluation  Clinical Decision Making (Complexity): Low complexity    PLAN OF CARE  Treatment Interventions:  Modalities: Cryotherapy, Hot Pack  Interventions: Gait Training, Manual Therapy, Neuromuscular Re-education, Therapeutic Activity, Therapeutic Exercise, Self-Care/Home Management    Long Term Goals     PT Goal 1  Goal Identifier: Walking  Goal Description: The patient will be able to walk x30 minutes with good stability, painfree and improved upright posture to improve safety and ease with functional movement in the community.  Target Date: 11/01/24      Frequency of Treatment: 1x.week tapering to 1x every 2 weeks  Duration of Treatment: 12 weeks    Recommended Referrals to Other Professionals:   Education Assessment:   Learner/Method: Patient  Education Comments: Eager to participate in therapy    Risks and benefits of evaluation/treatment have been explained.   Patient/Family/caregiver agrees with Plan of Care.     Evaluation Time:     PT Eval, Low Complexity Minutes (38794): 15       Signing Clinician: Yaima Caceres, PT        [facility]                                                                                   OUTPATIENT PHYSICAL THERAPY      PLAN OF TREATMENT FOR OUTPATIENT REHABILITATION   Patient's Last Name, First Name, Lucia Sharif YOB: 1955   Provider's Name   [facility]   Medical Record No.  [id number]     Onset Date: 07/18/24 (MD order date)  Start of Care Date: 08/09/24     Medical Diagnosis:  Pain of right scapula      PT Treatment Diagnosis:  Right Shoulder/Scapular Pain/Gait Instability Plan of Treatment  Frequency/Duration: 1x.week tapering to 1x every 2 weeks/ 12 weeks    Certification date from 08/09/24 to 11/01/24         See note for plan of treatment details and  functional goals     Yaima Caceres, PT                         I CERTIFY THE NEED FOR THESE SERVICES FURNISHED UNDER        THIS PLAN OF TREATMENT AND WHILE UNDER MY CARE     (Physician attestation of this document indicates review and certification of the therapy plan).              Referring Provider:  Pro Coles    Initial Assessment  See Epic Evaluation- Start of Care Date: 08/09/24

## 2024-08-15 ENCOUNTER — THERAPY VISIT (OUTPATIENT)
Dept: PHYSICAL THERAPY | Facility: CLINIC | Age: 69
End: 2024-08-15
Attending: INTERNAL MEDICINE
Payer: COMMERCIAL

## 2024-08-15 DIAGNOSIS — R26.81 UNSTEADINESS ON FEET: Primary | ICD-10-CM

## 2024-08-15 PROCEDURE — 97110 THERAPEUTIC EXERCISES: CPT | Mod: GP | Performed by: PHYSICAL THERAPIST

## 2024-08-15 PROCEDURE — 97112 NEUROMUSCULAR REEDUCATION: CPT | Mod: GP | Performed by: PHYSICAL THERAPIST

## 2024-09-17 ENCOUNTER — TELEPHONE (OUTPATIENT)
Dept: FAMILY MEDICINE | Facility: CLINIC | Age: 69
End: 2024-09-17
Payer: COMMERCIAL

## 2024-09-17 NOTE — TELEPHONE ENCOUNTER
Patient Quality Outreach    Patient is due for the following:   Colon Cancer Screening  Breast Cancer Screening - Mammogram      Topic Date Due    Zoster (Shingles) Vaccine (1 of 2) Never done    Pneumococcal Vaccine (1 of 1 - PCV) Never done    Flu Vaccine (1) 09/01/2024    COVID-19 Vaccine (3 - 2024-25 season) 09/01/2024       Next Steps:   Schedule a office visit for      Type of outreach:    Sent [employer] message.      Questions for provider review:    None           Farzana Fuentes

## 2024-09-27 ENCOUNTER — ANCILLARY PROCEDURE (OUTPATIENT)
Dept: BONE DENSITY | Facility: CLINIC | Age: 69
End: 2024-09-27
Attending: INTERNAL MEDICINE
Payer: COMMERCIAL

## 2024-09-27 DIAGNOSIS — E28.39 ESTROGEN DEFICIENCY: ICD-10-CM

## 2024-09-27 PROCEDURE — 77080 DXA BONE DENSITY AXIAL: CPT | Mod: TC | Performed by: PHYSICIAN ASSISTANT

## 2024-10-03 ENCOUNTER — PATIENT OUTREACH (OUTPATIENT)
Dept: CARE COORDINATION | Facility: CLINIC | Age: 69
End: 2024-10-03
Payer: COMMERCIAL

## 2024-10-14 ENCOUNTER — MYC MEDICAL ADVICE (OUTPATIENT)
Dept: FAMILY MEDICINE | Facility: CLINIC | Age: 69
End: 2024-10-14

## 2024-10-14 ENCOUNTER — VIRTUAL VISIT (OUTPATIENT)
Dept: FAMILY MEDICINE | Facility: CLINIC | Age: 69
End: 2024-10-14
Payer: COMMERCIAL

## 2024-10-14 DIAGNOSIS — M81.0 AGE-RELATED OSTEOPOROSIS WITHOUT CURRENT PATHOLOGICAL FRACTURE: Primary | ICD-10-CM

## 2024-10-14 PROCEDURE — 99213 OFFICE O/P EST LOW 20 MIN: CPT | Mod: 95 | Performed by: INTERNAL MEDICINE

## 2024-10-14 NOTE — PROGRESS NOTES
"  If patient has telephone visit, have they been educated on video visit as preferred visit method and offered to change to video visit? NOT APPLICABLE        Instructions Relayed to Patient by Virtual Roomer:     Patient is active on [employer]:   Relayed following to patient: \"It looks like you are active on [employer], are you able to join the visit this way? If not, do you need us to send you a link now or would you like your provider to send a link via text or email when they are ready to initiate the visit?\"      Patient Confirmed they will join visit via: Text Link to Cell Phone  Reminded patient to ensure they were logged on to virtual visit by arrival time listed.   Asked if patient has flexibility to initiate visit sooner than arrival time: patient is unable to initiate visit earlier than arrival time     If pediatric virtual visit, ensured pediatric patient along with parent/guardian will be present for video visit.     Patient offered the website [email or web address] and/or phone number to [employer] Help line: [phone]   Lucia is a 68 year old who is being evaluated via a billable video visit.    How would you like to obtain your AVS? [facility]  If the video visit is dropped, the invitation should be resent by: Text to cell phone: [phone]  Will anyone else be joining your video visit? No      Assessment & Plan     Age-related osteoporosis without current pathological fracture  Here to discuss about her DEXA scan  DEXA scan is clearly shows that she has osteoporosis  I discussed results with her  We discussed the calcium and vitamin D supplementations  She is already taking enough calcium in the form of milk and yogurt and cheese  Advised her to take 1000 units of vitamin D every day  I recommended that she should be on biphosphonate's  Discussed about the side effects of biphosphonate's  However at this point she does not want to do these and wants to just try diet and weightbearing " exercises  She is going to get a DEXA scan next year again   She will get this at the private pay and depending on the results of the DEXA scan she will decide if she wants to pursue biphosphonate's or not                Subjective   Lucia is a 68 year old, presenting for the following health issues:  Results      Video Start Time:  130 PM    History of Present Illness       Reason for visit:  Osteoporosis  Symptoms include:  Dr requested this visit, new diagnosis.  Symptom progression:  Staying the same                Review of Systems  Constitutional, HEENT, cardiovascular, pulmonary, gi and gu systems are negative, except as otherwise noted.      Objective           Vitals:  No vitals were obtained today due to virtual visit.    Physical Exam   GENERAL: alert and no distress  EYES: Eyes grossly normal to inspection.  No discharge or erythema, or obvious scleral/conjunctival abnormalities.  RESP: No audible wheeze, cough, or visible cyanosis.    SKIN: Visible skin clear. No significant rash, abnormal pigmentation or lesions.  NEURO: Cranial nerves grossly intact.  Mentation and speech appropriate for age.  PSYCH: Appropriate affect, tone, and pace of words          Video-Visit Details    Type of service:  Video Visit   Video End Time: 200 PM  Originating Location (pt. Location): Home    Distant Location (provider location):  Off-site  Platform used for Video Visit: [employer]  Signed Electronically by: Pro Coles MD

## 2024-12-03 NOTE — TELEPHONE ENCOUNTER
Patient Quality Outreach    Patient is due for the following:   Colon Cancer Screening  Breast Cancer Screening - Mammogram  Cervical Cancer Screening - PAP Needed      Topic Date Due    Zoster (Shingles) Vaccine (1 of 2) Never done    Pneumococcal Vaccine (1 of 1 - PCV) Never done    Flu Vaccine (1) 09/01/2024    COVID-19 Vaccine (3 - 2024-25 season) 09/01/2024       Action(s) Taken:   Schedule a office visit for      Type of outreach:    Sent [employer] message.    Questions for provider review:    None           Farzana Fuentes      2

## 2025-01-26 ENCOUNTER — HEALTH MAINTENANCE LETTER (OUTPATIENT)
Age: 70
End: 2025-01-26

## 2025-03-10 ENCOUNTER — TELEPHONE (OUTPATIENT)
Dept: FAMILY MEDICINE | Facility: CLINIC | Age: 70
End: 2025-03-10
Payer: COMMERCIAL

## 2025-03-10 NOTE — TELEPHONE ENCOUNTER
Patient Quality Outreach    Patient is due for the following:   Colon Cancer Screening  Breast Cancer Screening - Mammogram  Cervical Cancer Screening - PAP Needed      Topic Date Due    Pneumococcal Vaccine (1 of 1 - PCV) Never done    Zoster (Shingles) Vaccine (1 of 2) Never done    Flu Vaccine (1) 09/01/2024    COVID-19 Vaccine (3 - 2024-25 season) 09/01/2024       Action(s) Taken:   Schedule a office visit for pap    Type of outreach:    Sent [employer] message.    Questions for provider review:    None           Farzana Fuentes

## 2025-04-29 ENCOUNTER — PATIENT OUTREACH (OUTPATIENT)
Dept: CARE COORDINATION | Facility: CLINIC | Age: 70
End: 2025-04-29
Payer: COMMERCIAL

## 2025-06-18 ENCOUNTER — PATIENT OUTREACH (OUTPATIENT)
Dept: CARE COORDINATION | Facility: CLINIC | Age: 70
End: 2025-06-18
Payer: COMMERCIAL

## 2025-07-02 ENCOUNTER — PATIENT OUTREACH (OUTPATIENT)
Dept: CARE COORDINATION | Facility: CLINIC | Age: 70
End: 2025-07-02
Payer: COMMERCIAL

## 2025-08-18 ENCOUNTER — ANCILLARY PROCEDURE (OUTPATIENT)
Dept: MAMMOGRAPHY | Facility: HOSPITAL | Age: 70
End: 2025-08-18
Attending: INTERNAL MEDICINE
Payer: COMMERCIAL

## 2025-08-18 ENCOUNTER — OFFICE VISIT (OUTPATIENT)
Dept: OPHTHALMOLOGY | Facility: CLINIC | Age: 70
End: 2025-08-18
Payer: COMMERCIAL

## 2025-08-18 DIAGNOSIS — H43.812 POSTERIOR VITREOUS DETACHMENT, LEFT EYE: ICD-10-CM

## 2025-08-18 DIAGNOSIS — H52.4 PRESBYOPIA: ICD-10-CM

## 2025-08-18 DIAGNOSIS — Z12.31 VISIT FOR SCREENING MAMMOGRAM: ICD-10-CM

## 2025-08-18 DIAGNOSIS — Z01.01 ENCOUNTER FOR EXAMINATION OF EYES AND VISION WITH ABNORMAL FINDINGS: Primary | ICD-10-CM

## 2025-08-18 PROCEDURE — 92015 DETERMINE REFRACTIVE STATE: CPT | Performed by: OPHTHALMOLOGY

## 2025-08-18 PROCEDURE — 77063 BREAST TOMOSYNTHESIS BI: CPT

## 2025-08-18 PROCEDURE — 92004 COMPRE OPH EXAM NEW PT 1/>: CPT | Performed by: OPHTHALMOLOGY

## 2025-08-18 ASSESSMENT — VISUAL ACUITY
OS_SC+: -2
OS_SC: 20/20
METHOD: SNELLEN - LINEAR
OD_SC: 20/20
OD_SC+: -2

## 2025-08-18 ASSESSMENT — TONOMETRY
IOP_METHOD: APPLANATION
OD_IOP_MMHG: 13
OS_IOP_MMHG: 14

## 2025-08-18 ASSESSMENT — CONF VISUAL FIELD
OD_INFERIOR_TEMPORAL_RESTRICTION: 0
OS_INFERIOR_TEMPORAL_RESTRICTION: 0
OS_SUPERIOR_NASAL_RESTRICTION: 0
OS_NORMAL: 1
OD_INFERIOR_NASAL_RESTRICTION: 0
OD_NORMAL: 1
OS_INFERIOR_NASAL_RESTRICTION: 0
OD_SUPERIOR_TEMPORAL_RESTRICTION: 0
OD_SUPERIOR_NASAL_RESTRICTION: 0
OS_SUPERIOR_TEMPORAL_RESTRICTION: 0

## 2025-08-18 ASSESSMENT — REFRACTION_MANIFEST
OS_SPHERE: PLANO
OS_AXIS: 164
OD_CYLINDER: SPHERE
OS_ADD: +2.25
OD_SPHERE: +0.25
OS_CYLINDER: +0.50
OD_ADD: +2.25

## 2025-08-18 ASSESSMENT — EXTERNAL EXAM - LEFT EYE: OS_EXAM: NORMAL

## 2025-08-18 ASSESSMENT — SLIT LAMP EXAM - LIDS: COMMENTS: 1+ DERMATOCHALASIS

## 2025-08-18 ASSESSMENT — CUP TO DISC RATIO
OS_RATIO: 0.3
OD_RATIO: 0.25

## 2025-08-18 ASSESSMENT — EXTERNAL EXAM - RIGHT EYE: OD_EXAM: NORMAL

## 2025-08-25 ENCOUNTER — ANCILLARY PROCEDURE (OUTPATIENT)
Dept: MAMMOGRAPHY | Facility: CLINIC | Age: 70
End: 2025-08-25
Attending: INTERNAL MEDICINE
Payer: COMMERCIAL

## 2025-08-25 DIAGNOSIS — R92.8 ABNORMAL MAMMOGRAM: ICD-10-CM

## 2025-08-25 PROCEDURE — 77061 BREAST TOMOSYNTHESIS UNI: CPT | Mod: RT

## 2025-08-25 PROCEDURE — 76642 ULTRASOUND BREAST LIMITED: CPT | Mod: RT

## 2025-08-27 ENCOUNTER — ANCILLARY PROCEDURE (OUTPATIENT)
Dept: MAMMOGRAPHY | Facility: CLINIC | Age: 70
End: 2025-08-27
Attending: INTERNAL MEDICINE
Payer: COMMERCIAL